# Patient Record
Sex: FEMALE | Race: WHITE | NOT HISPANIC OR LATINO | Employment: OTHER | URBAN - METROPOLITAN AREA
[De-identification: names, ages, dates, MRNs, and addresses within clinical notes are randomized per-mention and may not be internally consistent; named-entity substitution may affect disease eponyms.]

---

## 2017-08-02 ENCOUNTER — HOSPITAL ENCOUNTER (OUTPATIENT)
Dept: RADIOLOGY | Facility: MEDICAL CENTER | Age: 57
End: 2017-08-02
Attending: NURSE PRACTITIONER
Payer: COMMERCIAL

## 2017-08-02 ENCOUNTER — TELEPHONE (OUTPATIENT)
Dept: MEDICAL GROUP | Facility: PHYSICIAN GROUP | Age: 57
End: 2017-08-02

## 2017-08-02 ENCOUNTER — OFFICE VISIT (OUTPATIENT)
Dept: MEDICAL GROUP | Facility: PHYSICIAN GROUP | Age: 57
End: 2017-08-02
Payer: COMMERCIAL

## 2017-08-02 VITALS
WEIGHT: 151 LBS | DIASTOLIC BLOOD PRESSURE: 76 MMHG | HEART RATE: 60 BPM | BODY MASS INDEX: 22.88 KG/M2 | HEIGHT: 68 IN | OXYGEN SATURATION: 95 % | TEMPERATURE: 98 F | SYSTOLIC BLOOD PRESSURE: 98 MMHG | RESPIRATION RATE: 14 BRPM

## 2017-08-02 DIAGNOSIS — E78.2 MIXED HYPERLIPIDEMIA: ICD-10-CM

## 2017-08-02 DIAGNOSIS — M54.12 CERVICAL RADICULOPATHY, ACUTE: ICD-10-CM

## 2017-08-02 DIAGNOSIS — M54.12 CERVICAL RADICULOPATHY, ACUTE: Primary | ICD-10-CM

## 2017-08-02 DIAGNOSIS — M25.842 CYST OF JOINT OF LEFT HAND: ICD-10-CM

## 2017-08-02 DIAGNOSIS — Z51.81 ENCOUNTER FOR MONITORING STATIN THERAPY: ICD-10-CM

## 2017-08-02 DIAGNOSIS — Z79.899 ENCOUNTER FOR MONITORING STATIN THERAPY: ICD-10-CM

## 2017-08-02 DIAGNOSIS — E06.3 HYPOTHYROIDISM, ACQUIRED, AUTOIMMUNE: ICD-10-CM

## 2017-08-02 DIAGNOSIS — M15.9 PRIMARY OSTEOARTHRITIS INVOLVING MULTIPLE JOINTS: ICD-10-CM

## 2017-08-02 PROCEDURE — 99214 OFFICE O/P EST MOD 30 MIN: CPT | Performed by: NURSE PRACTITIONER

## 2017-08-02 PROCEDURE — 72040 X-RAY EXAM NECK SPINE 2-3 VW: CPT

## 2017-08-02 RX ORDER — CELECOXIB 200 MG/1
200 CAPSULE ORAL DAILY
Qty: 90 CAP | Refills: 1 | Status: SHIPPED | OUTPATIENT
Start: 2017-08-02 | End: 2018-08-28 | Stop reason: SDUPTHER

## 2017-08-02 RX ORDER — LEVOTHYROXINE SODIUM 0.07 MG/1
75 TABLET ORAL
Qty: 90 TAB | Refills: 3 | Status: SHIPPED | OUTPATIENT
Start: 2017-08-02 | End: 2018-05-22

## 2017-08-03 ENCOUNTER — TELEPHONE (OUTPATIENT)
Dept: MEDICAL GROUP | Facility: PHYSICIAN GROUP | Age: 57
End: 2017-08-03

## 2017-08-03 ENCOUNTER — HOSPITAL ENCOUNTER (OUTPATIENT)
Dept: LAB | Facility: MEDICAL CENTER | Age: 57
End: 2017-08-03
Attending: NURSE PRACTITIONER
Payer: COMMERCIAL

## 2017-08-03 DIAGNOSIS — Z51.81 ENCOUNTER FOR MONITORING STATIN THERAPY: ICD-10-CM

## 2017-08-03 DIAGNOSIS — Z79.899 ENCOUNTER FOR MONITORING STATIN THERAPY: ICD-10-CM

## 2017-08-03 DIAGNOSIS — E78.2 MIXED HYPERLIPIDEMIA: ICD-10-CM

## 2017-08-03 DIAGNOSIS — E06.3 HYPOTHYROIDISM, ACQUIRED, AUTOIMMUNE: ICD-10-CM

## 2017-08-03 LAB
ALBUMIN SERPL BCP-MCNC: 4.2 G/DL (ref 3.2–4.9)
ALBUMIN/GLOB SERPL: 1.5 G/DL
ALP SERPL-CCNC: 70 U/L (ref 30–99)
ALT SERPL-CCNC: 33 U/L (ref 2–50)
ANION GAP SERPL CALC-SCNC: 7 MMOL/L (ref 0–11.9)
AST SERPL-CCNC: 19 U/L (ref 12–45)
BILIRUB SERPL-MCNC: 0.7 MG/DL (ref 0.1–1.5)
BUN SERPL-MCNC: 15 MG/DL (ref 8–22)
CALCIUM SERPL-MCNC: 9.6 MG/DL (ref 8.5–10.5)
CHLORIDE SERPL-SCNC: 106 MMOL/L (ref 96–112)
CHOLEST SERPL-MCNC: 275 MG/DL (ref 100–199)
CO2 SERPL-SCNC: 27 MMOL/L (ref 20–33)
CREAT SERPL-MCNC: 0.7 MG/DL (ref 0.5–1.4)
GFR SERPL CREATININE-BSD FRML MDRD: >60 ML/MIN/1.73 M 2
GLOBULIN SER CALC-MCNC: 2.8 G/DL (ref 1.9–3.5)
GLUCOSE SERPL-MCNC: 85 MG/DL (ref 65–99)
HDLC SERPL-MCNC: 45 MG/DL
LDLC SERPL CALC-MCNC: 192 MG/DL
POTASSIUM SERPL-SCNC: 4.3 MMOL/L (ref 3.6–5.5)
PROT SERPL-MCNC: 7 G/DL (ref 6–8.2)
SODIUM SERPL-SCNC: 140 MMOL/L (ref 135–145)
TRIGL SERPL-MCNC: 189 MG/DL (ref 0–149)
TSH SERPL DL<=0.005 MIU/L-ACNC: 3.55 UIU/ML (ref 0.3–3.7)

## 2017-08-03 PROCEDURE — 80053 COMPREHEN METABOLIC PANEL: CPT

## 2017-08-03 PROCEDURE — 36415 COLL VENOUS BLD VENIPUNCTURE: CPT

## 2017-08-03 PROCEDURE — 84443 ASSAY THYROID STIM HORMONE: CPT

## 2017-08-03 PROCEDURE — 80061 LIPID PANEL: CPT

## 2017-08-03 NOTE — TELEPHONE ENCOUNTER
----- Message from JOEL Banerjee sent at 8/3/2017  2:43 PM PDT -----  Patient's cholesterol is very high. Is she taking her Lipitor?    Thank you

## 2017-08-03 NOTE — TELEPHONE ENCOUNTER
Patient stop taking her Lipitor for a month. She said she would make sure she picks it up and takes it from now on.

## 2017-08-03 NOTE — TELEPHONE ENCOUNTER
----- Message from JOEL Banerjee sent at 8/2/2017  5:06 PM PDT -----  Xray reveals mild osteoarthritis.  Proceed with MRI scan.

## 2017-08-03 NOTE — PROGRESS NOTES
Chief Complaint   Patient presents with   • Arthritis       HISTORY OF PRESENT ILLNESS: Patient is a 57 y.o. female established patient who presents today to discuss the followin. Cervical radiculopathy, acute  Patient reports new symptom of intermittent pain and a altered sensation going into her right arm/hand.  She believes that she pulled a muscle in her back that started the symptoms.  She complains of pain not only into the arm but discomfort into the hand including all fingers.  She denies any injury to the neck or shoulder.  Denies any altered range of motion of the shoulder.  Denies any headaches, new or changing dizziness.  She has not tried any over-the-counter remedies since the onset of symptoms.  It does not interfere with her sleep or daily activities.    2. Primary osteoarthritis involving multiple joints  She is also frustrated about worsening osteoarthritis, primarily in her hands.  She has used Celebrex in the past and is asking if this is a continued option.  She denies any side effects in the past with Celebrex.  She states that her mother suffers from arthritis as well.  Denies any warmth redness or swelling of the joints.    3. Cyst of joint of left hand  She has a cyst on the top of her left hand.  She states that it has gotten quite a bit larger over the past several weeks.  It is not causing any significant pain or nerve pain into the hand.  She finds it aesthetically unpleasing.    4. Mixed hyperlipidemia  Patient has personal history of elevated cholesterol values.  She continues to use atorvastatin 80 mg.  She is approaching due for repeat labs.    Lab Results   Component Value Date/Time    CHOLESTEROL, 2016 09:41 AM    LDL 97 2016 09:41 AM    HDL 56 2016 09:41 AM    TRIGLYCERIDES 111 2016 09:41 AM       Lab Results   Component Value Date/Time    SODIUM 141 2016 09:41 AM    POTASSIUM 4.4 2016 09:41 AM    CHLORIDE 106 2016 09:41 AM     CO2 28 2016 09:41 AM    GLUCOSE 92 2016 09:41 AM    BUN 15 2016 09:41 AM    CREATININE 0.76 2016 09:41 AM     Lab Results   Component Value Date/Time    ALKALINE PHOSPHATASE 61 2016 09:41 AM    AST(SGOT) 29 2016 09:41 AM    ALT(SGPT) 44 2016 09:41 AM    TOTAL BILIRUBIN 0.8 2016 09:41 AM       5. Hypothyroidism, acquired, autoimmune  She also continues to use levothyroxine 75 µg daily.  Denies any side effects with the medication.  Denies any recent changes in her weight, energy level or bowel habits.    6. Encounter for monitoring statin therapy    Allergies:Review of patient's allergies indicates no known allergies.    Current Outpatient Prescriptions Ordered in [facility]   Medication Sig Dispense Refill   • celecoxib (CELEBREX) 200 MG Cap Take 1 Cap by mouth every day. 90 Cap 1   • levothyroxine (SYNTHROID) 75 MCG Tab Take 1 Tab by mouth Every morning on an empty stomach. Take one tablet each day for 6 days and one half tablet for one day each week 90 Tab 3   • atorvastatin (LIPITOR) 80 MG tablet Take 1 Tab by mouth every evening. 90 Tab 1   • Nutritional Supplements (ESTROVEN PO) Take  by mouth.       No current Epic-ordered facility-administered medications on file.       Past Medical History   Diagnosis Date   • Hyperlipidemia 10/21/2014   • Hypothyroidism    • Multiple thyroid nodules    • Chronic autoimmune thyroiditis       + US / + TPO =293       Social History   Substance Use Topics   • Smoking status: Never Smoker    • Smokeless tobacco: Never Used   • Alcohol Use: No       Family Status   Relation Status Death Age   • Mother Alive    • Father       Family History   Problem Relation Age of Onset   • Heart Disease Mother    • Other Father      cirrhosis   • Diabetes Neg Hx    • Stroke Neg Hx        ROS: see above    Review of Systems   Constitutional: Negative for fever, chills, weight loss and malaise/fatigue.   HENT: Negative for ear pain, nosebleeds,  "congestion, sore throat and neck pain.    Eyes: Negative for blurred vision.   Respiratory: Negative for cough, sputum production, shortness of breath and wheezing.    Cardiovascular: Negative for chest pain, palpitations, orthopnea and leg swelling.   Gastrointestinal: Negative for heartburn, nausea, vomiting and abdominal pain.   Genitourinary: Negative for dysuria, urgency and frequency.   Musculoskeletal: Negative for myalgias, back pain and joint pain.   Skin: Negative for rash and itching.   Neurological: Negative for dizziness, tingling, tremors, sensory change, focal weakness and headaches.   Endo/Heme/Allergies: Does not bruise/bleed easily.   Psychiatric/Behavioral: Negative for depression, suicidal ideas and memory loss.  The patient is not nervous/anxious and does not have insomnia.        Exam:  Blood pressure 98/76, pulse 60, temperature 36.7 °C (98 °F), resp. rate 14, height 1.727 m (5' 8\"), weight 68.493 kg (151 lb), SpO2 95 %.  General:  Well nourished, well developed female in NAD  Head is grossly normal.  Neck: Thyroid is not enlarged.  Pulmonary: Normal effort.   Cardiovascular: Regular rate.   Extremities: no clubbing, cyanosis, or edema.  Neurologic:  Full range of motion of both cervical spine in all fields and right shoulder.  Strength and sensation are intact throughout the upper extremities.  No Homans sign.  Psych:  Mood and affect are normal.  Answering questions appropriately with good eye contact.      Please note that this dictation was created using voice recognition software. I have made every reasonable attempt to correct obvious errors, but I expect that there are errors of grammar and possibly content that I did not discover before finalizing the note.    Assessment/Plan:    1. Cervical radiculopathy, acute  DX-CERVICAL SPINE-2 OR 3 VIEWS    REFERRAL TO PHYSIATRY (PMR)    MR-CERVICAL SPINE-W/O   2. Primary osteoarthritis involving multiple joints  celecoxib (CELEBREX) 200 MG Cap "    REFERRAL TO PHYSIATRY (PMR)   3. Cyst of joint of left hand  US-EXTREMITY NON VASCULAR UNILATERAL LEFT   4. Mixed hyperlipidemia  LIPID PROFILE   5. Hypothyroidism, acquired, autoimmune  TSH WITH REFLEX TO FT4    levothyroxine (SYNTHROID) 75 MCG Tab   6. Encounter for monitoring statin therapy  COMP METABOLIC PANEL        1.  Cervical x-rays today.  Patient insisting on MRI scan due to the nerve type symptoms.  Start with x-ray.  2.  Referral to physiatry to explore nonsurgical options for pain and symptom relief.  3.  Restart Celebrex at 200 mg once a day.  4.  Repeat labs within the next couple of weeks, orders given.  5.  Refill Synthroid at current dose until labs are reviewed to see if dose needs to be adjusted pending lab results.

## 2017-08-17 ENCOUNTER — HOSPITAL ENCOUNTER (OUTPATIENT)
Dept: RADIOLOGY | Facility: MEDICAL CENTER | Age: 57
End: 2017-08-17
Attending: NURSE PRACTITIONER
Payer: COMMERCIAL

## 2017-08-17 DIAGNOSIS — M54.12 CERVICAL RADICULOPATHY, ACUTE: ICD-10-CM

## 2017-08-17 DIAGNOSIS — M25.842 CYST OF JOINT OF LEFT HAND: ICD-10-CM

## 2017-08-17 PROCEDURE — 76882 US LMTD JT/FCL EVL NVASC XTR: CPT | Mod: LT

## 2017-08-17 PROCEDURE — 72141 MRI NECK SPINE W/O DYE: CPT

## 2017-08-21 ENCOUNTER — TELEPHONE (OUTPATIENT)
Dept: MEDICAL GROUP | Facility: PHYSICIAN GROUP | Age: 57
End: 2017-08-21

## 2017-08-21 NOTE — TELEPHONE ENCOUNTER
----- Message from JOEL Banerjee sent at 8/21/2017  8:15 AM PDT -----  US reveals likely ganglion cyst.  It is not harmful or worrisome.  We consider ortho referral if she wants to proceed with removal.  Thank you

## 2017-08-22 ENCOUNTER — OFFICE VISIT (OUTPATIENT)
Dept: MEDICAL GROUP | Facility: PHYSICIAN GROUP | Age: 57
End: 2017-08-22
Payer: COMMERCIAL

## 2017-08-22 VITALS
TEMPERATURE: 98.3 F | SYSTOLIC BLOOD PRESSURE: 94 MMHG | HEIGHT: 68 IN | RESPIRATION RATE: 16 BRPM | OXYGEN SATURATION: 95 % | DIASTOLIC BLOOD PRESSURE: 62 MMHG | HEART RATE: 64 BPM

## 2017-08-22 DIAGNOSIS — M67.442 GANGLION CYST OF FINGER OF LEFT HAND: ICD-10-CM

## 2017-08-22 DIAGNOSIS — M54.12 RIGHT CERVICAL RADICULOPATHY: Primary | ICD-10-CM

## 2017-08-22 PROCEDURE — 99213 OFFICE O/P EST LOW 20 MIN: CPT | Performed by: NURSE PRACTITIONER

## 2017-08-22 NOTE — PROGRESS NOTES
Chief Complaint   Patient presents with   • Follow-Up     options after US & MRI       HISTORY OF PRESENT ILLNESS: Patient is a 57 y.o. female established patient who presents today with her  to discuss recent imaging results:    1. Right cervical radiculopathy  Patient's most recent MRI results are reviewed with both her and her .  She continues to experience intermittent paresthesias into the right hand.  She states that there are times she has to rest or getting ready in the morning secondary to the discomfort.  Usually resolves within 10-15 seconds.  She continues to deny any significant neck pain.  MRI scan reveals mild disc bulge at C4-5 and C5-6 with facet arthropathy.  Patient has not had any formal treatment.  She does have an upcoming appointment with physiatry on September 5.  Continues to deny any extremity weakness or loss of bowel or bladder control.    2. Ganglion cyst of finger of left hand  Ultrasound of the cyst on the left hand also reveals benign finding, likely ganglion cyst.  Patient continues to deny any pain or numbness and tingling into the fingers secondary to this visual abnormality.  She does not feel as though surgical intervention is necessary at this time.    Allergies:Review of patient's allergies indicates no known allergies.    Current Outpatient Prescriptions Ordered in [facility]   Medication Sig Dispense Refill   • celecoxib (CELEBREX) 200 MG Cap Take 1 Cap by mouth every day. 90 Cap 1   • levothyroxine (SYNTHROID) 75 MCG Tab Take 1 Tab by mouth Every morning on an empty stomach. Take one tablet each day for 6 days and one half tablet for one day each week 90 Tab 3   • atorvastatin (LIPITOR) 80 MG tablet Take 1 Tab by mouth every evening. 90 Tab 1   • Nutritional Supplements (ESTROVEN PO) Take  by mouth.       No current Epic-ordered facility-administered medications on file.       Past Medical History   Diagnosis Date   • Hyperlipidemia 10/21/2014   • Hypothyroidism    •  "Multiple thyroid nodules    • Chronic autoimmune thyroiditis       + US / + TPO =293       Social History   Substance Use Topics   • Smoking status: Never Smoker    • Smokeless tobacco: Never Used   • Alcohol Use: No       Family Status   Relation Status Death Age   • Mother Alive    • Father       Family History   Problem Relation Age of Onset   • Heart Disease Mother    • Other Father      cirrhosis   • Diabetes Neg Hx    • Stroke Neg Hx        ROS: see above    Review of Systems   Constitutional: Negative for fever, chills, weight loss and malaise/fatigue.   HENT: Negative for ear pain, nosebleeds, congestion, sore throat and neck pain.    Eyes: Negative for blurred vision.   Respiratory: Negative for cough, sputum production, shortness of breath and wheezing.    Cardiovascular: Negative for chest pain, palpitations, orthopnea and leg swelling.   Gastrointestinal: Negative for heartburn, nausea, vomiting and abdominal pain.   Genitourinary: Negative for dysuria, urgency and frequency.   Musculoskeletal: Negative for myalgias, back pain and joint pain.   Skin: Negative for rash and itching.   Neurological: Negative for dizziness, tingling, tremors, sensory change, focal weakness and headaches.   Endo/Heme/Allergies: Does not bruise/bleed easily.   Psychiatric/Behavioral: Negative for depression, suicidal ideas and memory loss.  The patient is not nervous/anxious and does not have insomnia.        Exam:  Blood pressure 94/62, pulse 64, temperature 36.8 °C (98.3 °F), resp. rate 16, height 1.727 m (5' 8\"), SpO2 95 %.  General:  Well nourished, well developed female in NAD  Head is grossly normal.  Neck: Thyroid is not enlarged.  Pulmonary: Normal effort.   Cardiovascular: Regular rate.   Extremities: no clubbing, cyanosis, or edema.  Psych:  Mood and affect are normal.  Answering questions appropriately with good eye contact.      Please note that this dictation was created using voice recognition software. I " have made every reasonable attempt to correct obvious errors, but I expect that there are errors of grammar and possibly content that I did not discover before finalizing the note.    Assessment/Plan:    1. Right cervical radiculopathy     2. Ganglion cyst of finger of left hand          1.  Proceed with conservative evaluation by physiatry.  2.  Hold off on any additional treatment of the ganglion cyst, offered surgical referral if symptoms worsen.  3.  Transfer of care to new PCP prior to upcoming medication refill request.

## 2017-11-28 DIAGNOSIS — E78.2 MIXED HYPERLIPIDEMIA: ICD-10-CM

## 2017-11-29 NOTE — TELEPHONE ENCOUNTER
Was the patient seen in the last year in this department? Yes     Does patient have an active prescription for medications requested? No     Received Request Via: Pharmacy      Pt met protocol?: Yes, labs and ov 8/17 lipid profile high, pt needs to est with new pcp

## 2017-11-30 RX ORDER — ATORVASTATIN CALCIUM 80 MG/1
TABLET, FILM COATED ORAL
Qty: 90 TAB | Refills: 0 | Status: SHIPPED | OUTPATIENT
Start: 2017-11-30 | End: 2018-04-09 | Stop reason: SDUPTHER

## 2018-01-03 DIAGNOSIS — E06.3 HYPOTHYROIDISM, ACQUIRED, AUTOIMMUNE: ICD-10-CM

## 2018-01-03 DIAGNOSIS — E78.2 MIXED HYPERLIPIDEMIA: ICD-10-CM

## 2018-01-03 DIAGNOSIS — M15.9 PRIMARY OSTEOARTHRITIS INVOLVING MULTIPLE JOINTS: ICD-10-CM

## 2018-01-03 RX ORDER — LEVOTHYROXINE SODIUM 0.07 MG/1
75 TABLET ORAL
Qty: 90 TAB | Refills: 3 | Status: CANCELLED | OUTPATIENT
Start: 2018-01-03

## 2018-01-03 RX ORDER — ATORVASTATIN CALCIUM 80 MG/1
80 TABLET, FILM COATED ORAL
Qty: 90 TAB | Refills: 0 | Status: CANCELLED | OUTPATIENT
Start: 2018-01-03

## 2018-01-03 RX ORDER — CELECOXIB 200 MG/1
200 CAPSULE ORAL DAILY
Qty: 90 CAP | Refills: 1 | Status: CANCELLED | OUTPATIENT
Start: 2018-01-03

## 2018-01-29 ENCOUNTER — TELEPHONE (OUTPATIENT)
Dept: URGENT CARE | Facility: PHYSICIAN GROUP | Age: 58
End: 2018-01-29

## 2018-01-29 DIAGNOSIS — R53.83 FATIGUE, UNSPECIFIED TYPE: ICD-10-CM

## 2018-01-29 DIAGNOSIS — E78.2 MIXED HYPERLIPIDEMIA: ICD-10-CM

## 2018-01-29 DIAGNOSIS — E06.3 HYPOTHYROIDISM, ACQUIRED, AUTOIMMUNE: ICD-10-CM

## 2018-01-30 NOTE — TELEPHONE ENCOUNTER
This patient stopped by. Has an appointment to establish care beginning of April. She is leaving the country for two months to take care of family. She needs refills of her Lipitor and Levothyroxine. However, she is also concerned that she will need lab work done to determine the appropriate dosages of these medications.

## 2018-01-30 NOTE — TELEPHONE ENCOUNTER
I ordered lab work.  When she has her labs done, I will send refills of the appropriate doses of medication.  She is not signed up with [facility], so she needs to remind me to send in refills after she has her lab work done.  Thanks!

## 2018-02-01 NOTE — TELEPHONE ENCOUNTER
I've called the patient and let her know that her labs are available. She plans to come in tomorrow to get them done.

## 2018-02-02 ENCOUNTER — HOSPITAL ENCOUNTER (OUTPATIENT)
Dept: LAB | Facility: MEDICAL CENTER | Age: 58
End: 2018-02-02
Attending: NURSE PRACTITIONER
Payer: COMMERCIAL

## 2018-02-02 DIAGNOSIS — E06.3 HYPOTHYROIDISM, ACQUIRED, AUTOIMMUNE: ICD-10-CM

## 2018-02-02 DIAGNOSIS — E78.2 MIXED HYPERLIPIDEMIA: ICD-10-CM

## 2018-02-02 LAB
ALBUMIN SERPL BCP-MCNC: 4.9 G/DL (ref 3.2–4.9)
ALBUMIN/GLOB SERPL: 2 G/DL
ALP SERPL-CCNC: 60 U/L (ref 30–99)
ALT SERPL-CCNC: 27 U/L (ref 2–50)
ANION GAP SERPL CALC-SCNC: 5 MMOL/L (ref 0–11.9)
AST SERPL-CCNC: 20 U/L (ref 12–45)
BASOPHILS # BLD AUTO: 1.3 % (ref 0–1.8)
BASOPHILS # BLD: 0.05 K/UL (ref 0–0.12)
BILIRUB SERPL-MCNC: 0.7 MG/DL (ref 0.1–1.5)
BUN SERPL-MCNC: 16 MG/DL (ref 8–22)
CALCIUM SERPL-MCNC: 9.6 MG/DL (ref 8.5–10.5)
CHLORIDE SERPL-SCNC: 106 MMOL/L (ref 96–112)
CHOLEST SERPL-MCNC: 181 MG/DL (ref 100–199)
CO2 SERPL-SCNC: 30 MMOL/L (ref 20–33)
CREAT SERPL-MCNC: 0.76 MG/DL (ref 0.5–1.4)
EOSINOPHIL # BLD AUTO: 0.03 K/UL (ref 0–0.51)
EOSINOPHIL NFR BLD: 0.8 % (ref 0–6.9)
ERYTHROCYTE [DISTWIDTH] IN BLOOD BY AUTOMATED COUNT: 40.2 FL (ref 35.9–50)
GLOBULIN SER CALC-MCNC: 2.4 G/DL (ref 1.9–3.5)
GLUCOSE SERPL-MCNC: 92 MG/DL (ref 65–99)
HCT VFR BLD AUTO: 41.2 % (ref 37–47)
HDLC SERPL-MCNC: 62 MG/DL
HGB BLD-MCNC: 13.5 G/DL (ref 12–16)
IMM GRANULOCYTES # BLD AUTO: 0.01 K/UL (ref 0–0.11)
IMM GRANULOCYTES NFR BLD AUTO: 0.3 % (ref 0–0.9)
LDLC SERPL CALC-MCNC: 98 MG/DL
LYMPHOCYTES # BLD AUTO: 1.27 K/UL (ref 1–4.8)
LYMPHOCYTES NFR BLD: 34.2 % (ref 22–41)
MCH RBC QN AUTO: 28.2 PG (ref 27–33)
MCHC RBC AUTO-ENTMCNC: 32.8 G/DL (ref 33.6–35)
MCV RBC AUTO: 86 FL (ref 81.4–97.8)
MONOCYTES # BLD AUTO: 0.26 K/UL (ref 0–0.85)
MONOCYTES NFR BLD AUTO: 7 % (ref 0–13.4)
NEUTROPHILS # BLD AUTO: 2.09 K/UL (ref 2–7.15)
NEUTROPHILS NFR BLD: 56.4 % (ref 44–72)
NRBC # BLD AUTO: 0 K/UL
NRBC BLD-RTO: 0 /100 WBC
PLATELET # BLD AUTO: 208 K/UL (ref 164–446)
PMV BLD AUTO: 10.4 FL (ref 9–12.9)
POTASSIUM SERPL-SCNC: 4.2 MMOL/L (ref 3.6–5.5)
PROT SERPL-MCNC: 7.3 G/DL (ref 6–8.2)
RBC # BLD AUTO: 4.79 M/UL (ref 4.2–5.4)
SODIUM SERPL-SCNC: 141 MMOL/L (ref 135–145)
TRIGL SERPL-MCNC: 104 MG/DL (ref 0–149)
TSH SERPL DL<=0.005 MIU/L-ACNC: 1.92 UIU/ML (ref 0.38–5.33)
WBC # BLD AUTO: 3.7 K/UL (ref 4.8–10.8)

## 2018-02-02 PROCEDURE — 85025 COMPLETE CBC W/AUTO DIFF WBC: CPT

## 2018-02-02 PROCEDURE — 80061 LIPID PANEL: CPT

## 2018-02-02 PROCEDURE — 84443 ASSAY THYROID STIM HORMONE: CPT

## 2018-02-02 PROCEDURE — 36415 COLL VENOUS BLD VENIPUNCTURE: CPT

## 2018-02-02 PROCEDURE — 80053 COMPREHEN METABOLIC PANEL: CPT

## 2018-02-06 ENCOUNTER — TELEPHONE (OUTPATIENT)
Dept: MEDICAL GROUP | Facility: PHYSICIAN GROUP | Age: 58
End: 2018-02-06

## 2018-02-06 NOTE — TELEPHONE ENCOUNTER
----- Message from JOEL Rizo sent at 2/6/2018  8:23 AM PST -----  Please call patient and let her know that her labs look pretty good.  She should plan to follow-up if she has any questions.    Thank you!  Henry

## 2018-04-02 ENCOUNTER — OFFICE VISIT (OUTPATIENT)
Dept: MEDICAL GROUP | Facility: PHYSICIAN GROUP | Age: 58
End: 2018-04-02
Payer: COMMERCIAL

## 2018-04-02 VITALS
HEART RATE: 74 BPM | WEIGHT: 152 LBS | SYSTOLIC BLOOD PRESSURE: 116 MMHG | TEMPERATURE: 99.1 F | DIASTOLIC BLOOD PRESSURE: 60 MMHG | BODY MASS INDEX: 23.04 KG/M2 | HEIGHT: 68 IN | RESPIRATION RATE: 16 BRPM | OXYGEN SATURATION: 96 %

## 2018-04-02 DIAGNOSIS — E04.1 THYROID NODULE: ICD-10-CM

## 2018-04-02 DIAGNOSIS — R93.89 ABNORMAL FINDING ON IMAGING: ICD-10-CM

## 2018-04-02 DIAGNOSIS — Z76.89 ENCOUNTER TO ESTABLISH CARE WITH NEW DOCTOR: ICD-10-CM

## 2018-04-02 DIAGNOSIS — E04.2 MULTIPLE THYROID NODULES: ICD-10-CM

## 2018-04-02 PROCEDURE — 99214 OFFICE O/P EST MOD 30 MIN: CPT | Performed by: NURSE PRACTITIONER

## 2018-04-02 ASSESSMENT — PATIENT HEALTH QUESTIONNAIRE - PHQ9: CLINICAL INTERPRETATION OF PHQ2 SCORE: 0

## 2018-04-02 NOTE — PROGRESS NOTES
Chief Complaint   Patient presents with   • Hypothyroidism     Synthroid refill    • Other     req sinocine and kidney US       HISTORY OF PRESENT ILLNESS: Patient is a 58 y.o. female new patient who presents today to discuss the following issues:    Encounter to establish care with new doctor  Is here to establish with a new primary care provider.  Was previously seen by Addie Cordon.    Abnormal finding on imaging  Patient just returned from [place] and had an ultrasound done while she was there.  She states that they found something on one of her kidneys that has never been there before.  I am not clear as to why they have been checking renal ultrasounds, but we will proceed with ordering an ultrasound today for further evaluation.    Multiple thyroid nodules  It has been a few years since her last thyroid ultrasound and she would like to proceed with another follow-up ultrasound.  Her recent lab results were normal.      Patient Active Problem List    Diagnosis Date Noted   • Encounter to establish care with new doctor 04/02/2018   • Thyroid nodule 04/02/2018   • Abnormal finding on imaging 04/02/2018   • Hypothyroidism, acquired, autoimmune 03/30/2016   • Multiple thyroid nodules 08/12/2015   • Nasal sinus cyst 08/12/2015   • Bilateral shoulder pain 08/12/2015   • Fatigue 07/24/2015   • Family history of heart disease in female family member before age 65 07/24/2015   • Hyperlipidemia 10/21/2014   • S/P laminectomy 10/21/2014   • Osteopenia 10/21/2014       Allergies:Patient has no known allergies.    Current Outpatient Prescriptions   Medication Sig Dispense Refill   • atorvastatin (LIPITOR) 80 MG tablet TAKE 1 TABLET BY MOUTH EVERY EVENING 90 Tab 0   • celecoxib (CELEBREX) 200 MG Cap Take 1 Cap by mouth every day. 90 Cap 1   • levothyroxine (SYNTHROID) 75 MCG Tab Take 1 Tab by mouth Every morning on an empty stomach. Take one tablet each day for 6 days and one half tablet for one day each week 90 Tab 3   •  "Nutritional Supplements (ESTROVEN PO) Take  by mouth.       No current facility-administered medications for this visit.        Social History   Substance Use Topics   • Smoking status: Never Smoker   • Smokeless tobacco: Never Used   • Alcohol use No       Family Status   Relation Status   • Mother Alive   • Father    • Neg Hx      Family History   Problem Relation Age of Onset   • Heart Disease Mother    • Other Father      cirrhosis   • Diabetes Neg Hx    • Stroke Neg Hx        Review of Systems:   Constitutional: Negative for fever, chills, weight loss and malaise/fatigue.   HENT: Negative for ear pain, nosebleeds, congestion, sore throat and neck pain.    Eyes: Negative for blurred vision.   Respiratory: Negative for cough, sputum production, shortness of breath and wheezing.    Cardiovascular: Negative for chest pain, palpitations, orthopnea and leg swelling.   Gastrointestinal: Negative for heartburn, nausea, vomiting and abdominal pain.   Genitourinary: Negative for dysuria, urgency and frequency.   Musculoskeletal: Negative for myalgias, joint pain, and back pain.  Skin: Negative for rash and itching.   Neurological: Negative for dizziness, tingling, tremors, sensory change, focal weakness and headaches.   Endo/Heme/Allergies: Does not bruise/bleed easily.   Psychiatric/Behavioral: Negative for depression, suicidal ideas and memory loss.  The patient is not nervous/anxious and does not have insomnia.    All other systems reviewed and are negative except as in HPI.    Exam:  Blood pressure 116/60, pulse 74, temperature 37.3 °C (99.1 °F), resp. rate 16, height 1.727 m (5' 8\"), weight 68.9 kg (152 lb), SpO2 96 %.  General:  Well nourished, well developed female in NAD  Head: Grossly normal.  Neck: Supple without JVD or bruit. Thyroid is not enlarged.  Pulmonary: Clear to ausculation. Normal effort. No rales, ronchi, or wheezing.  Cardiovascular: Regular rate and rhythm without murmur.   Abdomen:  Bowel " sounds + x 4. Soft, non-tender, nondistended.  Extremities: No clubbing, cyanosis, or edema.  Skin: Intact with no obvious rashes or lesions.  Neuro: Grossly intact.  Psych: Alert and oriented x 3.  Mood and affect appropriate.    Medical decision-making and discussion: Vivi is here to establish with a new primary care provider.  We reviewed her past medical history and discussed her current medications. Ultrasounds of her kidneys and her thyroid were ordered. She will sign a records release for her previous provider, she will sign up with [facility], and she will plan to follow-up here as needed.         Assessment/Plan:  1. Abnormal finding on imaging  US-RENAL   2. Thyroid nodule  US-SOFT TISSUES OF HEAD - NECK   3. Encounter to establish care with new doctor     4. Multiple thyroid nodules         Return if symptoms worsen or fail to improve.    Please note that this dictation was created using voice recognition software. I have made every reasonable attempt to correct obvious errors, but I expect that there are errors of grammar and possibly content that I did not discover before finalizing the note.

## 2018-04-02 NOTE — ASSESSMENT & PLAN NOTE
It has been a few years since her last thyroid ultrasound and she would like to proceed with another follow-up ultrasound.  Her recent lab results were normal.

## 2018-04-02 NOTE — ASSESSMENT & PLAN NOTE
Patient just returned from [place] and had an ultrasound done while she was there.  She states that they found something on one of her kidneys that has never been there before.  I am not clear as to why they have been checking renal ultrasounds, but we will proceed with ordering an ultrasound today for further evaluation.

## 2018-04-09 DIAGNOSIS — E78.2 MIXED HYPERLIPIDEMIA: ICD-10-CM

## 2018-04-10 RX ORDER — ATORVASTATIN CALCIUM 80 MG/1
TABLET, FILM COATED ORAL
Qty: 90 TAB | Refills: 1 | Status: SHIPPED | OUTPATIENT
Start: 2018-04-10 | End: 2018-12-05 | Stop reason: SDUPTHER

## 2018-04-10 NOTE — TELEPHONE ENCOUNTER
Pt has had OV within the 12 month protocol and lipid panel is current. 6 month supply sent to pharmacy.   Lab Results   Component Value Date/Time    CHOLSTRLTOT 181 02/02/2018 09:56 AM    LDL 98 02/02/2018 09:56 AM    HDL 62 02/02/2018 09:56 AM    TRIGLYCERIDE 104 02/02/2018 09:56 AM       Lab Results   Component Value Date/Time    SODIUM 141 02/02/2018 09:56 AM    POTASSIUM 4.2 02/02/2018 09:56 AM    CHLORIDE 106 02/02/2018 09:56 AM    CO2 30 02/02/2018 09:56 AM    GLUCOSE 92 02/02/2018 09:56 AM    BUN 16 02/02/2018 09:56 AM    CREATININE 0.76 02/02/2018 09:56 AM     Lab Results   Component Value Date/Time    ALKPHOSPHAT 60 02/02/2018 09:56 AM    ASTSGOT 20 02/02/2018 09:56 AM    ALTSGPT 27 02/02/2018 09:56 AM    TBILIRUBIN 0.7 02/02/2018 09:56 AM

## 2018-04-10 NOTE — TELEPHONE ENCOUNTER
Was the patient seen in the last year in this department? Yes     Does patient have an active prescription for medications requested? No     Received Request Via: Pharmacy      Pt met protocol?: Yes, OV last week   Lab Results   Component Value Date/Time    CHOLSTRLTOT 181 02/02/2018 09:56 AM    LDL 98 02/02/2018 09:56 AM    HDL 62 02/02/2018 09:56 AM    TRIGLYCERIDE 104 02/02/2018 09:56 AM       Lab Results   Component Value Date/Time    SODIUM 141 02/02/2018 09:56 AM    POTASSIUM 4.2 02/02/2018 09:56 AM    CHLORIDE 106 02/02/2018 09:56 AM    CO2 30 02/02/2018 09:56 AM    GLUCOSE 92 02/02/2018 09:56 AM    BUN 16 02/02/2018 09:56 AM    CREATININE 0.76 02/02/2018 09:56 AM     Lab Results   Component Value Date/Time    ALKPHOSPHAT 60 02/02/2018 09:56 AM    ASTSGOT 20 02/02/2018 09:56 AM    ALTSGPT 27 02/02/2018 09:56 AM    TBILIRUBIN 0.7 02/02/2018 09:56 AM

## 2018-04-11 ENCOUNTER — HOSPITAL ENCOUNTER (OUTPATIENT)
Dept: RADIOLOGY | Facility: MEDICAL CENTER | Age: 58
End: 2018-04-11
Attending: NURSE PRACTITIONER
Payer: COMMERCIAL

## 2018-04-11 DIAGNOSIS — R93.89 ABNORMAL FINDING ON IMAGING: ICD-10-CM

## 2018-04-11 DIAGNOSIS — E04.1 THYROID NODULE: ICD-10-CM

## 2018-04-11 PROCEDURE — 76775 US EXAM ABDO BACK WALL LIM: CPT

## 2018-04-11 PROCEDURE — 76536 US EXAM OF HEAD AND NECK: CPT

## 2018-04-17 ENCOUNTER — TELEPHONE (OUTPATIENT)
Dept: MEDICAL GROUP | Facility: PHYSICIAN GROUP | Age: 58
End: 2018-04-17

## 2018-04-17 NOTE — TELEPHONE ENCOUNTER
----- Message from JOEL Rizo sent at 4/17/2018 11:15 AM PDT -----  Please call patient and let her know that I would like her to schedule a follow-up appointment to go over her test results.  It is not urgent.    Thank you!  Henry

## 2018-04-23 ENCOUNTER — OFFICE VISIT (OUTPATIENT)
Dept: MEDICAL GROUP | Facility: PHYSICIAN GROUP | Age: 58
End: 2018-04-23
Payer: COMMERCIAL

## 2018-04-23 VITALS
SYSTOLIC BLOOD PRESSURE: 108 MMHG | RESPIRATION RATE: 14 BRPM | WEIGHT: 152 LBS | BODY MASS INDEX: 23.04 KG/M2 | HEIGHT: 68 IN | HEART RATE: 61 BPM | OXYGEN SATURATION: 96 % | TEMPERATURE: 98.2 F | DIASTOLIC BLOOD PRESSURE: 88 MMHG

## 2018-04-23 DIAGNOSIS — M67.40 GANGLION CYST: ICD-10-CM

## 2018-04-23 DIAGNOSIS — E04.1 THYROID NODULE: ICD-10-CM

## 2018-04-23 DIAGNOSIS — N28.89 RENAL MASS: ICD-10-CM

## 2018-04-23 PROBLEM — R22.1 NECK MASS: Status: ACTIVE | Noted: 2018-04-23

## 2018-04-23 PROCEDURE — 99214 OFFICE O/P EST MOD 30 MIN: CPT | Performed by: INTERNAL MEDICINE

## 2018-04-23 NOTE — ASSESSMENT & PLAN NOTE
She visited her family in [place] about a year ago and had an ultrasound test for surveillance and was then told about a mass on her bilateral kidneys. She wasn't having any symptoms. Denies pain, hematuria, family history of kidney problems.

## 2018-04-23 NOTE — ASSESSMENT & PLAN NOTE
She just recently had a thyroid ultrasound to follow up on reported thyroid nodule. She has had a biopsy about 2 years but was told enough tissue wasn't taken to have a conclusive result. It seems she saw Dr. Humphrey in 2015 at which time her biopsy was done and showed benign results. She also saw Dr. Coello about a year ago who had offered reassurance. She denies swallowing difficulties.

## 2018-04-23 NOTE — PROGRESS NOTES
PRIMARY CARE CLINIC FOLLOW UP VISIT  Chief Complaint   Patient presents with   • Results     US- Renal/Thyroid      History of Present Illness     Neck mass  She just recently had a thyroid ultrasound to follow up on reported thyroid nodule. She has had a biopsy about 2 years but was told enough tissue wasn't taken to have a conclusive result. It seems she saw Dr. Humphrey in 2015 at which time her biopsy was done and showed benign results. She also saw Dr. Coello about a year ago who had offered reassurance. She denies swallowing difficulties.     Thyroid nodule  She just recently had a thyroid ultrasound to follow up on reported thyroid nodule. She has had a biopsy about 2 years but was told enough tissue wasn't taken to have a conclusive result. It seems she saw Dr. Humphrey in 2015 at which time her biopsy was done and showed benign results. She also saw Dr. Coello about a year ago who had offered reassurance. She denies swallowing difficulties.     Renal mass  She visited her family in [place] about a year ago and had an ultrasound test for surveillance and was then told about a mass on her bilateral kidneys. She wasn't having any symptoms. Denies pain, hematuria, family history of kidney problems.     Ganglion cyst  She's had a cyst in her left hand that was drained by has returned.       Current Outpatient Prescriptions   Medication Sig Dispense Refill   • atorvastatin (LIPITOR) 80 MG tablet TAKE 1 TABLET BY MOUTH EVERY EVENING 90 Tab 1   • celecoxib (CELEBREX) 200 MG Cap Take 1 Cap by mouth every day. 90 Cap 1   • levothyroxine (SYNTHROID) 75 MCG Tab Take 1 Tab by mouth Every morning on an empty stomach. Take one tablet each day for 6 days and one half tablet for one day each week 90 Tab 3   • Nutritional Supplements (ESTROVEN PO) Take  by mouth.       No current facility-administered medications for this visit.      Past Medical History:   Diagnosis Date   • Chronic autoimmune thyroiditis      + US / + TPO  "=293   • Hyperlipidemia 10/21/2014   • Hypothyroidism    • Multiple thyroid nodules      Past Surgical History:   Procedure Laterality Date   • OTHER ORTHOPEDIC SURGERY      L2-3 discectomy     Social History   Substance Use Topics   • Smoking status: Never Smoker   • Smokeless tobacco: Never Used   • Alcohol use No     Social History     Social History Narrative   • No narrative on file     Family History   Problem Relation Age of Onset   • Heart Disease Mother    • Other Father      cirrhosis   • Diabetes Neg Hx    • Stroke Neg Hx      Family Status   Relation Status   • Mother Alive   • Father    • Neg Hx      Allergies: Patient has no known allergies.    ROS  As per HPI above. All other systems reviewed and negative.        Objective   Blood pressure 108/88, pulse 61, temperature 36.8 °C (98.2 °F), resp. rate 14, height 1.727 m (5' 8\"), weight 68.9 kg (152 lb), SpO2 96 %. Body mass index is 23.11 kg/m².    General: alert and oriented, pleasant, cooperative  HEENT: Normocephalic, atraumatic. No thyroid masses.   Cardiovascular: regular rate and rhythm, normal S1/S2  Pulmonary: lungs clear to auscultation bilaterally  Gastrointestinal: no tenderness to palpation. No hepatosplenomegaly. Bowel sounds normoactive  Skin: warm and dry, no lesions or rashes. Ganglion cyst of left wrist   Psychiatric: appropriate mood and affect. Good insight and appropriate judgment     Assessment and Plan   The following treatment plan was discussed     1. Renal mass  Did discuss with her that pan-scanning in asymptomatic patients is not recommended and we would only recommend age appropriate cancer screening as offered by her primary care provider. However, given that the ultrasound shows the renal cyst will evaluate further with MRI.   - MR-ABDOMEN-WITH & W/O; Future  - BASIC METABOLIC PANEL; Future    2. Thyroid nodule  It seems her findings are stable on ultrasound and would recommend she follow up with her endocrinologist " Dr. Coello as she last saw him in 2016. Her TSH when checked earlier this year was normal.   - REFERRAL TO ENDOCRINOLOGY    3. Ganglion cyst  Did advise her that draining this cyst often leads to recurrence, as it has in her. Recommended easing stress of that hand and may try splint.       Return if symptoms worsen or fail to improve.    Teofilo Bartlett MD  Internal Medicine  [facility]

## 2018-04-24 ENCOUNTER — HOSPITAL ENCOUNTER (OUTPATIENT)
Dept: LAB | Facility: MEDICAL CENTER | Age: 58
End: 2018-04-24
Attending: INTERNAL MEDICINE
Payer: COMMERCIAL

## 2018-04-24 DIAGNOSIS — N28.89 RENAL MASS: ICD-10-CM

## 2018-04-24 LAB
ANION GAP SERPL CALC-SCNC: 9 MMOL/L (ref 0–11.9)
BUN SERPL-MCNC: 14 MG/DL (ref 8–22)
CALCIUM SERPL-MCNC: 9.7 MG/DL (ref 8.5–10.5)
CHLORIDE SERPL-SCNC: 104 MMOL/L (ref 96–112)
CO2 SERPL-SCNC: 28 MMOL/L (ref 20–33)
CREAT SERPL-MCNC: 0.77 MG/DL (ref 0.5–1.4)
GLUCOSE SERPL-MCNC: 86 MG/DL (ref 65–99)
POTASSIUM SERPL-SCNC: 3.9 MMOL/L (ref 3.6–5.5)
SODIUM SERPL-SCNC: 141 MMOL/L (ref 135–145)

## 2018-04-24 PROCEDURE — 36415 COLL VENOUS BLD VENIPUNCTURE: CPT

## 2018-04-24 PROCEDURE — 80048 BASIC METABOLIC PNL TOTAL CA: CPT

## 2018-05-22 ENCOUNTER — OFFICE VISIT (OUTPATIENT)
Dept: ENDOCRINOLOGY | Facility: MEDICAL CENTER | Age: 58
End: 2018-05-22
Payer: COMMERCIAL

## 2018-05-22 VITALS
BODY MASS INDEX: 23.98 KG/M2 | HEIGHT: 67 IN | WEIGHT: 152.8 LBS | OXYGEN SATURATION: 96 % | SYSTOLIC BLOOD PRESSURE: 114 MMHG | HEART RATE: 82 BPM | DIASTOLIC BLOOD PRESSURE: 74 MMHG

## 2018-05-22 DIAGNOSIS — E04.2 MULTIPLE THYROID NODULES: ICD-10-CM

## 2018-05-22 DIAGNOSIS — E06.3 HYPOTHYROIDISM, ACQUIRED, AUTOIMMUNE: ICD-10-CM

## 2018-05-22 PROBLEM — E04.1 THYROID NODULE: Status: RESOLVED | Noted: 2018-04-02 | Resolved: 2018-05-22

## 2018-05-22 PROCEDURE — 99213 OFFICE O/P EST LOW 20 MIN: CPT | Performed by: INTERNAL MEDICINE

## 2018-05-22 RX ORDER — LEVOTHYROXINE SODIUM 0.07 MG/1
TABLET ORAL
Qty: 30 TAB | Refills: 6
Start: 2018-05-22 | End: 2018-11-29 | Stop reason: SDUPTHER

## 2018-05-22 NOTE — PROGRESS NOTES
"Chief Complaint   Patient presents with   • Hypothyroidism     autoimmune thyroiditis / multiple thyroid nodules        HPI:    See assessment and recommendations below    ROS:   Constitutional   feels generally healthy  Eyes   vision stable  ENT    no pain or epistaxis, no unusual congestion  Cardiac   no angina, no arrhythmia  Respiratory   no hemoptysis, no unusual dyspnea, no cough  GI    no pain, indigestion, or unexpected bowel change     no voiding symptoms  Musculoskeletal    no unusual or new pains  Skin   no suspicious or concerning lesions  Neuro  no unusual weakness or loss of function  Psych   appears alert and appropriate  Lymph   no new or unusual lumps or nodules      Allergies: No Known Allergies    Current medicines including changes today:  Current Outpatient Prescriptions   Medication Sig Dispense Refill   • levothyroxine (SYNTHROID) 75 MCG Tab One half tablet each morning empty stomach 30 Tab 6   • atorvastatin (LIPITOR) 80 MG tablet TAKE 1 TABLET BY MOUTH EVERY EVENING 90 Tab 1   • celecoxib (CELEBREX) 200 MG Cap Take 1 Cap by mouth every day. 90 Cap 1   • Nutritional Supplements (ESTROVEN PO) Take  by mouth.       No current facility-administered medications for this visit.         Past Medical History:   Diagnosis Date   • Chronic autoimmune thyroiditis      + US / + TPO =293   • Hyperlipidemia 10/21/2014   • Hypothyroidism    • Multiple thyroid nodules        PHYSICAL EXAM:    /74   Pulse 82   Ht 1.694 m (5' 6.7\")   Wt 69.3 kg (152 lb 12.8 oz)   SpO2 96%   BMI 24.15 kg/m²      Gen.   appears healthy    Skin   appropriate for sex and age    HEENT  unremarkable    Neck   thyroid gland is difficult to palpate. I think it is small. No palpable nodules in the area or elsewhere in the neck or supraclavicular areas      Heart  regular    Extremities  no edema    Neuro  gait and station normal, no tremor    Psych  appropriate, pleasant, calm      ASSESSMENT AND RECOMMENDATIONS    1. " "Hypothyroidism, acquired, autoimmune           Clinically euthyroid taking about 37.5 µg levothyroxine daily. She felt 75 µg was too much because apparently loss so she started cutting it in half a few months ago.           Recent TSH 1.9 to satisfactory. I will not change the dose.  - levothyroxine (SYNTHROID) 75 MCG Tab; One half tablet each morning empty stomach  Dispense: 30 Tab; Refill: 6    2. Multiple thyroid nodules            Thyroid gland is asymptomatic. Patient is not aware of any change in her gland. No discomfort. No difficulty swallowing, breathing, or voice change.            Current thyroid ultrasound in April indicates that gland is markedly heterogeneous which is consistent with thyroiditis. The 11 mm nodule in the left lobe is unchanged. The nodule in the right lobe was not identified as such which is probably related to her thyroiditis. Previous FNA biopsy of both nodules was \"inadequate\". I don't think we need to repeat FNA            Recommend repeat thyroid ultrasound in one year.      DISPOSITION:   One-year follow-up       Fede Coello M.D.    Copies to: Henry Jacob, A.P.N. [phone]  "

## 2018-07-24 ENCOUNTER — HOSPITAL ENCOUNTER (OUTPATIENT)
Dept: RADIOLOGY | Facility: MEDICAL CENTER | Age: 58
End: 2018-07-24
Attending: FAMILY MEDICINE
Payer: COMMERCIAL

## 2018-07-24 ENCOUNTER — TELEPHONE (OUTPATIENT)
Dept: MEDICAL GROUP | Facility: PHYSICIAN GROUP | Age: 58
End: 2018-07-24

## 2018-07-24 ENCOUNTER — OFFICE VISIT (OUTPATIENT)
Dept: MEDICAL GROUP | Facility: PHYSICIAN GROUP | Age: 58
End: 2018-07-24
Payer: COMMERCIAL

## 2018-07-24 VITALS
SYSTOLIC BLOOD PRESSURE: 116 MMHG | TEMPERATURE: 98 F | BODY MASS INDEX: 23.46 KG/M2 | HEIGHT: 68 IN | OXYGEN SATURATION: 59 % | RESPIRATION RATE: 20 BRPM | DIASTOLIC BLOOD PRESSURE: 82 MMHG | WEIGHT: 154.8 LBS | HEART RATE: 95 BPM

## 2018-07-24 DIAGNOSIS — R22.32 MASS OF WRIST, LEFT: ICD-10-CM

## 2018-07-24 PROCEDURE — 73110 X-RAY EXAM OF WRIST: CPT | Mod: LT

## 2018-07-24 PROCEDURE — 99214 OFFICE O/P EST MOD 30 MIN: CPT | Performed by: FAMILY MEDICINE

## 2018-07-24 NOTE — PROGRESS NOTES
Chief Complaint   Patient presents with   • Cyst     top left wrist x 6 months       HISTORY OF PRESENT ILLNESS: Patient is a 58 y.o. female established patient here today for the following concerns:    1. Mass of wrist, left  Vivi is a pleasant 58 year old female here today with concern over left dorsal wrist mass for the last year.  Reports that it has been expanding more and limiting her ROM.  It is tender.  In February attempted to have it drained but it quickly within a month had re-accumulated.  She would like to have it treated now.        Past Medical, Social, and Family history reviewed and updated in EPIC    Allergies:Patient has no known allergies.    Current Outpatient Prescriptions   Medication Sig Dispense Refill   • levothyroxine (SYNTHROID) 75 MCG Tab One half tablet each morning empty stomach 30 Tab 6   • atorvastatin (LIPITOR) 80 MG tablet TAKE 1 TABLET BY MOUTH EVERY EVENING 90 Tab 1   • celecoxib (CELEBREX) 200 MG Cap Take 1 Cap by mouth every day. 90 Cap 1   • Nutritional Supplements (ESTROVEN PO) Take  by mouth.       No current facility-administered medications for this visit.          ROS:  Review of Systems   Constitutional: Negative for fever, chills, weight loss and malaise/fatigue.   HENT: Negative for ear pain, nosebleeds, congestion, sore throat and neck pain.    Eyes: Negative for blurred vision.   Respiratory: Negative for cough, sputum production, shortness of breath and wheezing.    Cardiovascular: Negative for chest pain, palpitations,  and leg swelling.   Gastrointestinal: Negative for heartburn, nausea, vomiting, diarrhea and abdominal pain.   Genitourinary: Negative for dysuria, urgency and frequency.   Musculoskeletal: Negative for myalgias, back pain and joint pain.   Skin: Negative for rash and itching.   Neurological: Negative for dizziness, tingling, tremors, sensory change, focal weakness and headaches.   Endo/Heme/Allergies: Does not bruise/bleed easily.  "  Psychiatric/Behavioral: Negative for depression, anxiety, suicidal ideas, insomnia and memory loss.      Exam:  Blood pressure 116/82, pulse 95, temperature 36.7 °C (98 °F), resp. rate 20, height 1.727 m (5' 8\"), weight 70.2 kg (154 lb 12.8 oz), SpO2 (!) 59 %.    General:  Well nourished, well developed in NAD  Head is grossly normal.  Neck: Supple without JVD   Pulmonary:  Normal effort.   Cardiovascular: Regular rate  Extremities: no clubbing, cyanosis, or edema.  Psych: affect appropriate  MSK: left wrist with dorsal cystic lesion more prominent with dorsiflexion.      Please note that this dictation was created using voice recognition software. I have made every reasonable attempt to correct obvious errors, but I expect that there are errors of grammar and possibly content that I did not discover before finalizing the note.    Assessment/Plan:  1. Mass of wrist, left  Will obtain xrays to help evaluate joint involvement.  We will also get consultation with ortho hand for excisional biopsy if indicated.    - DX-WRIST-COMPLETE 3+ LEFT; Future  - REFERRAL TO HAND SURGERY            "

## 2018-07-25 NOTE — TELEPHONE ENCOUNTER
----- Message from Analilia Daniels M.D. sent at 7/24/2018  5:16 PM PDT -----  Soft tissue mass shows no concerning calcifications, recommend follow up with ortho when convenient.

## 2018-08-28 DIAGNOSIS — M15.9 PRIMARY OSTEOARTHRITIS INVOLVING MULTIPLE JOINTS: ICD-10-CM

## 2018-08-28 DIAGNOSIS — E06.3 HYPOTHYROIDISM, ACQUIRED, AUTOIMMUNE: ICD-10-CM

## 2018-08-29 RX ORDER — LEVOTHYROXINE SODIUM 0.07 MG/1
TABLET ORAL
Refills: 0 | OUTPATIENT
Start: 2018-08-29

## 2018-08-29 RX ORDER — CELECOXIB 200 MG/1
CAPSULE ORAL
Qty: 90 CAP | Refills: 1 | Status: SHIPPED | OUTPATIENT
Start: 2018-08-29 | End: 2018-12-05 | Stop reason: SDUPTHER

## 2018-08-29 NOTE — TELEPHONE ENCOUNTER
Was the patient seen in the last year in this department? Yes    Does patient have an active prescription for medications requested? No     Received Request Via: Pharmacy      Pt met protocol?: Yes    OV  7/18

## 2018-11-27 DIAGNOSIS — E06.3 HYPOTHYROIDISM, ACQUIRED, AUTOIMMUNE: ICD-10-CM

## 2018-11-27 DIAGNOSIS — E04.2 MULTIPLE THYROID NODULES: ICD-10-CM

## 2018-11-29 RX ORDER — LEVOTHYROXINE SODIUM 0.07 MG/1
TABLET ORAL
Qty: 90 TAB | Refills: 1 | OUTPATIENT
Start: 2018-11-29

## 2018-11-29 RX ORDER — LEVOTHYROXINE SODIUM 0.07 MG/1
TABLET ORAL
Qty: 90 TAB | Refills: 1 | Status: SHIPPED | OUTPATIENT
Start: 2018-11-29 | End: 2018-12-05 | Stop reason: SDUPTHER

## 2018-11-29 NOTE — TELEPHONE ENCOUNTER
Was the patient seen in the last year in this department? Yes    Does patient have an active prescription for medications requested? No     Received Request Via: Pharmacy    Pt met protocol?: Yes     Last OV 07/2018  TSH   Date Value Ref Range Status   02/02/2018 1.920 0.380 - 5.330 uIU/mL Final     Comment:     Please note new reference ranges effective 12/14/2017 10:00 AM  Pregnant Females, 1st Trimester  0.050-3.700  Pregnant Females, 2nd Trimester  0.310-4.350  Pregnant Females, 3rd Trimester  0.410-5.180

## 2018-12-05 ENCOUNTER — HOSPITAL ENCOUNTER (OUTPATIENT)
Dept: RADIOLOGY | Facility: MEDICAL CENTER | Age: 58
End: 2018-12-05
Attending: NURSE PRACTITIONER
Payer: COMMERCIAL

## 2018-12-05 ENCOUNTER — OFFICE VISIT (OUTPATIENT)
Dept: MEDICAL GROUP | Facility: PHYSICIAN GROUP | Age: 58
End: 2018-12-05
Payer: COMMERCIAL

## 2018-12-05 VITALS
HEART RATE: 61 BPM | DIASTOLIC BLOOD PRESSURE: 60 MMHG | OXYGEN SATURATION: 96 % | SYSTOLIC BLOOD PRESSURE: 108 MMHG | HEIGHT: 68 IN | WEIGHT: 152 LBS | RESPIRATION RATE: 16 BRPM | TEMPERATURE: 98.6 F | BODY MASS INDEX: 23.04 KG/M2

## 2018-12-05 DIAGNOSIS — E03.9 HYPOTHYROIDISM, UNSPECIFIED TYPE: ICD-10-CM

## 2018-12-05 DIAGNOSIS — E04.2 MULTIPLE THYROID NODULES: ICD-10-CM

## 2018-12-05 DIAGNOSIS — M79.672 LEFT FOOT PAIN: ICD-10-CM

## 2018-12-05 DIAGNOSIS — N28.89 OTHER SPECIFIED DISORDERS OF KIDNEY AND URETER: ICD-10-CM

## 2018-12-05 DIAGNOSIS — N28.89 RENAL MASS: ICD-10-CM

## 2018-12-05 DIAGNOSIS — E78.5 HYPERLIPIDEMIA, UNSPECIFIED HYPERLIPIDEMIA TYPE: ICD-10-CM

## 2018-12-05 DIAGNOSIS — M15.9 PRIMARY OSTEOARTHRITIS INVOLVING MULTIPLE JOINTS: ICD-10-CM

## 2018-12-05 DIAGNOSIS — R79.89 LOW VITAMIN D LEVEL: ICD-10-CM

## 2018-12-05 DIAGNOSIS — E06.3 HYPOTHYROIDISM, ACQUIRED, AUTOIMMUNE: ICD-10-CM

## 2018-12-05 DIAGNOSIS — E78.2 MIXED HYPERLIPIDEMIA: ICD-10-CM

## 2018-12-05 PROBLEM — Z76.89 ENCOUNTER TO ESTABLISH CARE WITH NEW DOCTOR: Status: RESOLVED | Noted: 2018-04-02 | Resolved: 2018-12-05

## 2018-12-05 PROCEDURE — 99214 OFFICE O/P EST MOD 30 MIN: CPT | Performed by: NURSE PRACTITIONER

## 2018-12-05 PROCEDURE — 73620 X-RAY EXAM OF FOOT: CPT | Mod: LT

## 2018-12-05 RX ORDER — CELECOXIB 200 MG/1
200 CAPSULE ORAL
Qty: 90 CAP | Refills: 3 | Status: SHIPPED | OUTPATIENT
Start: 2018-12-05 | End: 2020-03-03 | Stop reason: SDUPTHER

## 2018-12-05 RX ORDER — LEVOTHYROXINE SODIUM 0.07 MG/1
TABLET ORAL
Qty: 90 TAB | Refills: 3 | Status: SHIPPED | OUTPATIENT
Start: 2018-12-05 | End: 2020-01-16 | Stop reason: SDUPTHER

## 2018-12-05 RX ORDER — ATORVASTATIN CALCIUM 80 MG/1
TABLET, FILM COATED ORAL
Qty: 90 TAB | Refills: 3 | Status: SHIPPED | OUTPATIENT
Start: 2018-12-05 | End: 2018-12-26

## 2018-12-05 NOTE — PROGRESS NOTES
Chief Complaint   Patient presents with   • Pain     (L) Foot    • Other     Concerned about kidneys        HISTORY OF PRESENT ILLNESS: Patient is a 58 y.o. female established patient who presents today to discuss the following issues:    Renal mass  Has a renal mass that was seen on ultrasound, and an MRI was recommended for further evaluation.  She never had the MRI done and is requesting another ultrasound.  We discussed options, and she agrees to proceed with the MRI.    Left foot pain  Reports that she hit her left foot very hard against something about 8 months ago.  She has had a bump ever since, and she has been having pain in the arch of that foot as well.  She wants a test to make sure that she doesn't have bone cancer.  Discussed plan to proceed with an xray to rule out old fracture.      Patient Active Problem List    Diagnosis Date Noted   • Left foot pain 12/05/2018   • Renal mass 04/23/2018   • Ganglion cyst 04/23/2018   • Abnormal finding on imaging 04/02/2018   • Hypothyroidism, acquired, autoimmune 03/30/2016   • Multiple thyroid nodules 08/12/2015   • Nasal sinus cyst 08/12/2015   • Bilateral shoulder pain 08/12/2015   • Fatigue 07/24/2015   • Family history of heart disease in female family member before age 65 07/24/2015   • Hyperlipidemia 10/21/2014   • S/P laminectomy 10/21/2014   • Osteopenia 10/21/2014       Allergies:Patient has no known allergies.    Current Outpatient Prescriptions   Medication Sig Dispense Refill   • atorvastatin (LIPITOR) 80 MG tablet TAKE 1 TABLET BY MOUTH EVERY EVENING 90 Tab 3   • celecoxib (CELEBREX) 200 MG Cap Take 1 Cap by mouth every day. 90 Cap 3   • levothyroxine (SYNTHROID) 75 MCG Tab One half tablet each morning empty stomach 90 Tab 3   • Nutritional Supplements (ESTROVEN PO) Take  by mouth.       No current facility-administered medications for this visit.        Social History   Substance Use Topics   • Smoking status: Never Smoker   • Smokeless tobacco:  "Never Used   • Alcohol use No       Family Status   Relation Status   • Mo Alive   • Fa    • Neg Hx (Not Specified)     Family History   Problem Relation Age of Onset   • Heart Disease Mother    • Other Father         cirrhosis   • Diabetes Neg Hx    • Stroke Neg Hx        Review of Systems:   Constitutional: Negative for fever, chills, weight loss and malaise/fatigue.   HENT: Negative for ear pain, nosebleeds, congestion, sore throat and neck pain.    Eyes: Negative for blurred vision.   Respiratory: Negative for cough, sputum production, shortness of breath and wheezing.    Cardiovascular: Negative for chest pain, palpitations, orthopnea and leg swelling.   Gastrointestinal: Negative for heartburn, nausea, vomiting and abdominal pain.   Genitourinary: Negative for dysuria, urgency and frequency.   Musculoskeletal: Negative for myalgias, joint pain, and back pain.  Positive for left foot pain.  Skin: Negative for rash and itching.   Neurological: Negative for dizziness, tingling, tremors, sensory change, focal weakness and headaches.   Endo/Heme/Allergies: Does not bruise/bleed easily.   Psychiatric/Behavioral: Negative for depression, suicidal ideas and memory loss.  The patient is not nervous/anxious and does not have insomnia.    All other systems reviewed and are negative except as in HPI.    Exam:  Blood pressure 108/60, pulse 61, temperature 37 °C (98.6 °F), resp. rate 16, height 1.727 m (5' 8\"), weight 68.9 kg (152 lb), SpO2 96 %.  General:  Well nourished, well developed female in NAD  Head: Grossly normal.  Neck: Supple without JVD or bruit. Thyroid is not enlarged.  Pulmonary: Clear to ausculation. Normal effort. No rales, ronchi, or wheezing.  Cardiovascular: Regular rate and rhythm without murmur.   Abdomen:  Soft, nontender, nondistended.  Extremities: No clubbing, cyanosis, or edema. Left foot nontender.  Skin: Intact with no obvious rashes or lesions.  Neuro: Grossly intact.  Psych: Alert and " oriented x 3.  Mood and affect appropriate.    Medical decision-making and discussion: Vivi is here today to discuss a few issues.  Lab work, MRI, and Xray were ordered, and her medications were refilled.  She will follow-up here as needed.          Assessment/Plan:  1. Other specified disorders of kidney and ureter  MR-ABDOMEN-WITH & W/O   2. Left foot pain  DX-FOOT-2- LEFT   3. Hypothyroidism, unspecified type  TSH   4. Low vitamin D level  VITAMIN D,25 HYDROXY   5. Hyperlipidemia, unspecified hyperlipidemia type  CBC WITH DIFFERENTIAL    COMP METABOLIC PANEL    Lipid Profile   6. Renal mass  COMP METABOLIC PANEL    URINALYSIS,CULTURE IF INDICATED   7. Mixed hyperlipidemia  atorvastatin (LIPITOR) 80 MG tablet   8. Primary osteoarthritis involving multiple joints  celecoxib (CELEBREX) 200 MG Cap   9. Hypothyroidism, acquired, autoimmune  levothyroxine (SYNTHROID) 75 MCG Tab   10. Multiple thyroid nodules  levothyroxine (SYNTHROID) 75 MCG Tab       Return if symptoms worsen or fail to improve.    Please note that this dictation was created using voice recognition software. I have made every reasonable attempt to correct obvious errors, but I expect that there are errors of grammar and possibly content that I did not discover before finalizing the note.

## 2018-12-06 ENCOUNTER — HOSPITAL ENCOUNTER (OUTPATIENT)
Dept: LAB | Facility: MEDICAL CENTER | Age: 58
End: 2018-12-06
Attending: NURSE PRACTITIONER
Payer: COMMERCIAL

## 2018-12-06 DIAGNOSIS — R79.89 LOW VITAMIN D LEVEL: ICD-10-CM

## 2018-12-06 DIAGNOSIS — E03.9 HYPOTHYROIDISM, UNSPECIFIED TYPE: ICD-10-CM

## 2018-12-06 DIAGNOSIS — N28.89 RENAL MASS: ICD-10-CM

## 2018-12-06 DIAGNOSIS — E78.5 HYPERLIPIDEMIA, UNSPECIFIED HYPERLIPIDEMIA TYPE: ICD-10-CM

## 2018-12-06 LAB
25(OH)D3 SERPL-MCNC: 20 NG/ML (ref 30–100)
ALBUMIN SERPL BCP-MCNC: 4.5 G/DL (ref 3.2–4.9)
ALBUMIN/GLOB SERPL: 1.6 G/DL
ALP SERPL-CCNC: 77 U/L (ref 30–99)
ALT SERPL-CCNC: 19 U/L (ref 2–50)
ANION GAP SERPL CALC-SCNC: 7 MMOL/L (ref 0–11.9)
APPEARANCE UR: CLEAR
AST SERPL-CCNC: 19 U/L (ref 12–45)
BASOPHILS # BLD AUTO: 1.1 % (ref 0–1.8)
BASOPHILS # BLD: 0.05 K/UL (ref 0–0.12)
BILIRUB SERPL-MCNC: 0.9 MG/DL (ref 0.1–1.5)
BILIRUB UR QL STRIP.AUTO: NEGATIVE
BUN SERPL-MCNC: 16 MG/DL (ref 8–22)
CALCIUM SERPL-MCNC: 9.6 MG/DL (ref 8.5–10.5)
CHLORIDE SERPL-SCNC: 106 MMOL/L (ref 96–112)
CHOLEST SERPL-MCNC: 171 MG/DL (ref 100–199)
CO2 SERPL-SCNC: 30 MMOL/L (ref 20–33)
COLOR UR: YELLOW
CREAT SERPL-MCNC: 0.84 MG/DL (ref 0.5–1.4)
EOSINOPHIL # BLD AUTO: 0.04 K/UL (ref 0–0.51)
EOSINOPHIL NFR BLD: 0.8 % (ref 0–6.9)
ERYTHROCYTE [DISTWIDTH] IN BLOOD BY AUTOMATED COUNT: 38.9 FL (ref 35.9–50)
FASTING STATUS PATIENT QL REPORTED: NORMAL
GLOBULIN SER CALC-MCNC: 2.8 G/DL (ref 1.9–3.5)
GLUCOSE SERPL-MCNC: 89 MG/DL (ref 65–99)
GLUCOSE UR STRIP.AUTO-MCNC: NEGATIVE MG/DL
HCT VFR BLD AUTO: 41.5 % (ref 37–47)
HDLC SERPL-MCNC: 62 MG/DL
HGB BLD-MCNC: 13.2 G/DL (ref 12–16)
IMM GRANULOCYTES # BLD AUTO: 0.01 K/UL (ref 0–0.11)
IMM GRANULOCYTES NFR BLD AUTO: 0.2 % (ref 0–0.9)
KETONES UR STRIP.AUTO-MCNC: NEGATIVE MG/DL
LDLC SERPL CALC-MCNC: 93 MG/DL
LEUKOCYTE ESTERASE UR QL STRIP.AUTO: NEGATIVE
LYMPHOCYTES # BLD AUTO: 1.9 K/UL (ref 1–4.8)
LYMPHOCYTES NFR BLD: 40.3 % (ref 22–41)
MCH RBC QN AUTO: 27.8 PG (ref 27–33)
MCHC RBC AUTO-ENTMCNC: 31.8 G/DL (ref 33.6–35)
MCV RBC AUTO: 87.6 FL (ref 81.4–97.8)
MICRO URNS: NORMAL
MONOCYTES # BLD AUTO: 0.32 K/UL (ref 0–0.85)
MONOCYTES NFR BLD AUTO: 6.8 % (ref 0–13.4)
NEUTROPHILS # BLD AUTO: 2.39 K/UL (ref 2–7.15)
NEUTROPHILS NFR BLD: 50.8 % (ref 44–72)
NITRITE UR QL STRIP.AUTO: NEGATIVE
NRBC # BLD AUTO: 0 K/UL
NRBC BLD-RTO: 0 /100 WBC
PH UR STRIP.AUTO: 7 [PH]
PLATELET # BLD AUTO: 227 K/UL (ref 164–446)
PMV BLD AUTO: 10.3 FL (ref 9–12.9)
POTASSIUM SERPL-SCNC: 4.4 MMOL/L (ref 3.6–5.5)
PROT SERPL-MCNC: 7.3 G/DL (ref 6–8.2)
PROT UR QL STRIP: NEGATIVE MG/DL
RBC # BLD AUTO: 4.74 M/UL (ref 4.2–5.4)
RBC UR QL AUTO: NEGATIVE
SODIUM SERPL-SCNC: 143 MMOL/L (ref 135–145)
SP GR UR STRIP.AUTO: 1.01
TRIGL SERPL-MCNC: 78 MG/DL (ref 0–149)
TSH SERPL DL<=0.005 MIU/L-ACNC: 3.09 UIU/ML (ref 0.38–5.33)
UROBILINOGEN UR STRIP.AUTO-MCNC: 0.2 MG/DL
WBC # BLD AUTO: 4.7 K/UL (ref 4.8–10.8)

## 2018-12-06 PROCEDURE — 81003 URINALYSIS AUTO W/O SCOPE: CPT

## 2018-12-06 PROCEDURE — 80053 COMPREHEN METABOLIC PANEL: CPT

## 2018-12-06 PROCEDURE — 84443 ASSAY THYROID STIM HORMONE: CPT

## 2018-12-06 PROCEDURE — 85025 COMPLETE CBC W/AUTO DIFF WBC: CPT

## 2018-12-06 PROCEDURE — 80061 LIPID PANEL: CPT

## 2018-12-06 PROCEDURE — 82306 VITAMIN D 25 HYDROXY: CPT

## 2018-12-06 PROCEDURE — 36415 COLL VENOUS BLD VENIPUNCTURE: CPT

## 2018-12-06 NOTE — ASSESSMENT & PLAN NOTE
Reports that she hit her left foot very hard against something about 8 months ago.  She has had a bump ever since, and she has been having pain in the arch of that foot as well.  She wants a test to make sure that she doesn't have bone cancer.  Discussed plan to proceed with an xray to rule out old fracture.

## 2018-12-06 NOTE — ASSESSMENT & PLAN NOTE
Has a renal mass that was seen on ultrasound, and an MRI was recommended for further evaluation.  She never had the MRI done and is requesting another ultrasound.  We discussed options, and she agrees to proceed with the MRI.

## 2018-12-24 ENCOUNTER — HOSPITAL ENCOUNTER (OUTPATIENT)
Dept: RADIOLOGY | Facility: MEDICAL CENTER | Age: 58
End: 2018-12-24
Attending: NURSE PRACTITIONER
Payer: COMMERCIAL

## 2018-12-24 DIAGNOSIS — N28.89 OTHER SPECIFIED DISORDERS OF KIDNEY AND URETER: ICD-10-CM

## 2018-12-24 PROCEDURE — A9585 GADOBUTROL INJECTION: HCPCS | Performed by: NURSE PRACTITIONER

## 2018-12-24 PROCEDURE — 700117 HCHG RX CONTRAST REV CODE 255: Performed by: NURSE PRACTITIONER

## 2018-12-24 PROCEDURE — 74183 MRI ABD W/O CNTR FLWD CNTR: CPT

## 2018-12-24 RX ORDER — GADOBUTROL 604.72 MG/ML
7 INJECTION INTRAVENOUS ONCE
Status: COMPLETED | OUTPATIENT
Start: 2018-12-24 | End: 2018-12-24

## 2018-12-24 RX ADMIN — GADOBUTROL 7 ML: 604.72 INJECTION INTRAVENOUS at 11:14

## 2018-12-26 ENCOUNTER — OFFICE VISIT (OUTPATIENT)
Dept: MEDICAL GROUP | Facility: PHYSICIAN GROUP | Age: 58
End: 2018-12-26
Payer: COMMERCIAL

## 2018-12-26 VITALS
SYSTOLIC BLOOD PRESSURE: 120 MMHG | HEART RATE: 61 BPM | DIASTOLIC BLOOD PRESSURE: 70 MMHG | WEIGHT: 151 LBS | OXYGEN SATURATION: 94 % | BODY MASS INDEX: 22.88 KG/M2 | RESPIRATION RATE: 16 BRPM | HEIGHT: 68 IN | TEMPERATURE: 97.5 F

## 2018-12-26 DIAGNOSIS — E78.5 HYPERLIPIDEMIA, UNSPECIFIED HYPERLIPIDEMIA TYPE: ICD-10-CM

## 2018-12-26 DIAGNOSIS — R79.89 LOW VITAMIN D LEVEL: ICD-10-CM

## 2018-12-26 DIAGNOSIS — Z78.0 POSTMENOPAUSAL: ICD-10-CM

## 2018-12-26 DIAGNOSIS — N28.89 RENAL MASS: ICD-10-CM

## 2018-12-26 DIAGNOSIS — M79.672 LEFT FOOT PAIN: ICD-10-CM

## 2018-12-26 PROCEDURE — 99214 OFFICE O/P EST MOD 30 MIN: CPT | Performed by: NURSE PRACTITIONER

## 2018-12-26 RX ORDER — ERGOCALCIFEROL 1.25 MG/1
CAPSULE ORAL
Qty: 16 CAP | Refills: 0 | Status: SHIPPED | OUTPATIENT
Start: 2018-12-26 | End: 2020-03-05

## 2018-12-26 RX ORDER — ATORVASTATIN CALCIUM 20 MG/1
20 TABLET, FILM COATED ORAL DAILY
Qty: 90 TAB | Refills: 3 | Status: SHIPPED | OUTPATIENT
Start: 2018-12-26 | End: 2020-02-19 | Stop reason: SDUPTHER

## 2018-12-26 NOTE — PROGRESS NOTES
Chief Complaint   Patient presents with   • Results     Labs        HISTORY OF PRESENT ILLNESS: Patient is a 58 y.o. 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  Discussed there is no suspicion for malignancy.    Left foot pain  Reviewed recent xray.  Was negative for fracture, positive for bone demineralization.  Will proceed with DEXA.    Low vitamin D level  Reviewed recent lab results.  Vit D is low.  Rx for ergocalciferol was sent to her pharmacy.    Hyperlipidemia  Chronic in nature.  Labs reviewed.  Stable on meds.  Has been taking 1/4 of her Lipitor 80 mg.  Will send RX for Lipitor 20 to her pharmacy.      Patient Active Problem List    Diagnosis Date Noted   • Low vitamin D level 12/26/2018   • Left foot pain 12/05/2018   • Renal mass 04/23/2018   • Ganglion cyst 04/23/2018   • Abnormal finding on imaging 04/02/2018   • Hypothyroidism, acquired, autoimmune 03/30/2016   • Multiple thyroid nodules 08/12/2015   • Nasal sinus cyst 08/12/2015   • Bilateral shoulder pain 08/12/2015   • Fatigue 07/24/2015   • Family history of heart disease in female family member before age 65 07/24/2015   • Hyperlipidemia 10/21/2014   • S/P laminectomy 10/21/2014   • Osteopenia 10/21/2014       Allergies:Patient has no known allergies.    Current Outpatient Prescriptions   Medication Sig Dispense Refill   • ergocalciferol (DRISDOL) 77806 UNIT capsule Take 1 cap by mouth every day for 10 days, and then take 1 cap by mouth every Tuesday and Saturday until prescription is completed. 16 Cap 0   • atorvastatin (LIPITOR) 20 MG Tab Take 1 Tab by mouth every day. 90 Tab 3   • celecoxib (CELEBREX) 200 MG Cap Take 1 Cap by mouth every day. 90 Cap 3   • levothyroxine (SYNTHROID) 75 MCG Tab One half tablet each morning empty stomach 90 Tab 3   • Nutritional Supplements (ESTROVEN PO) Take  by mouth.       No current  "facility-administered medications for this visit.        Social History   Substance Use Topics   • Smoking status: Never Smoker   • Smokeless tobacco: Never Used   • Alcohol use No       Family Status   Relation Status   • Mo Alive   • Fa    • Neg Hx (Not Specified)     Family History   Problem Relation Age of Onset   • Heart Disease Mother    • Other Father         cirrhosis   • Diabetes Neg Hx    • Stroke Neg Hx        Review of Systems:   Constitutional: Negative for fever, chills, weight loss and malaise/fatigue.   HENT: Negative for ear pain, nosebleeds, congestion, sore throat and neck pain.    Eyes: Negative for blurred vision.   Respiratory: Negative for cough, sputum production, shortness of breath and wheezing.    Cardiovascular: Negative for chest pain, palpitations, orthopnea and leg swelling.   Gastrointestinal: Negative for heartburn, nausea, vomiting and abdominal pain.   Genitourinary: Negative for dysuria, urgency and frequency.   Musculoskeletal: Negative for myalgias, joint pain, and back pain. Positive for left foot pain.  Skin: Negative for rash and itching.   Neurological: Negative for dizziness, tingling, tremors, sensory change, focal weakness and headaches.   Endo/Heme/Allergies: Does not bruise/bleed easily.   Psychiatric/Behavioral: Negative for depression, suicidal ideas and memory loss.  The patient is not nervous/anxious and does not have insomnia.    All other systems reviewed and are negative except as in HPI.    Exam:  Blood pressure 120/70, pulse 61, temperature 36.4 °C (97.5 °F), resp. rate 16, height 1.727 m (5' 8\"), weight 68.5 kg (151 lb), SpO2 94 %.  General:  Well nourished, well developed female in NAD  Head: Grossly normal.  Neck: Supple without JVD or bruit. Thyroid is not enlarged.  Pulmonary: Clear to ausculation. Normal effort. No rales, ronchi, or wheezing.  Cardiovascular: Regular rate and rhythm without murmur.   Abdomen:  Soft, nontender, " nondistended.  Extremities: No clubbing, cyanosis, or edema.  Skin: Intact with no obvious rashes or lesions.  Neuro: Grossly intact.  Psych: Alert and oriented x 3.  Mood and affect appropriate.    Medical decision-making and discussion: Vivi is here today for follow-up.  We reviewed MRI, xray, and lab results.  A DEXA was ordered and prescriptions for atorvastatin and ergocalciferol were sent to her pharmacy.  She will follow-up here as needed.          Assessment/Plan:  1. Postmenopausal  DS-BONE DENSITY STUDY (DEXA)   2. Low vitamin D level  ergocalciferol (DRISDOL) 14747 UNIT capsule   3. Hyperlipidemia, unspecified hyperlipidemia type  atorvastatin (LIPITOR) 20 MG Tab   4. Renal mass     5. Left foot pain         Return if symptoms worsen or fail to improve.    Please note that this dictation was created using voice recognition software. I have made every reasonable attempt to correct obvious errors, but I expect that there are errors of grammar and possibly content that I did not discover before finalizing the note.

## 2018-12-26 NOTE — ASSESSMENT & PLAN NOTE
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  Discussed there is no suspicion for malignancy.

## 2018-12-26 NOTE — ASSESSMENT & PLAN NOTE
Chronic in nature.  Labs reviewed.  Stable on meds.  Has been taking 1/4 of her Lipitor 80 mg.  Will send RX for Lipitor 20 to her pharmacy.

## 2018-12-26 NOTE — ASSESSMENT & PLAN NOTE
Reviewed recent xray.  Was negative for fracture, positive for bone demineralization.  Will proceed with DEXA.

## 2019-10-21 ENCOUNTER — TELEPHONE (OUTPATIENT)
Dept: ENDOCRINOLOGY | Facility: MEDICAL CENTER | Age: 59
End: 2019-10-21

## 2019-10-21 DIAGNOSIS — E04.2 MULTIPLE THYROID NODULES: ICD-10-CM

## 2019-10-21 DIAGNOSIS — E06.3 HYPOTHYROIDISM, ACQUIRED, AUTOIMMUNE: ICD-10-CM

## 2019-10-21 NOTE — TELEPHONE ENCOUNTER
1. Caller Name: Vivi Sommer                                         Call Back Number: [phone] (home) [phone] (work)      Patient approves a detailed voicemail message: yes    Pt requesting an order to be placed for Throid U/S and Thyroid labs. She will need us to call her when those orders have been placed. She will call once she has them done and make an appointment to go over the results with Dr. Coello. She get her labs and imaging done at [facility].

## 2019-10-24 ENCOUNTER — HOSPITAL ENCOUNTER (OUTPATIENT)
Dept: LAB | Facility: MEDICAL CENTER | Age: 59
End: 2019-10-24
Attending: INTERNAL MEDICINE
Payer: COMMERCIAL

## 2019-10-24 DIAGNOSIS — E06.3 HYPOTHYROIDISM, ACQUIRED, AUTOIMMUNE: ICD-10-CM

## 2019-10-24 LAB
T4 FREE SERPL-MCNC: 0.7 NG/DL (ref 0.53–1.43)
TSH SERPL DL<=0.005 MIU/L-ACNC: 2.99 UIU/ML (ref 0.38–5.33)

## 2019-10-24 PROCEDURE — 84439 ASSAY OF FREE THYROXINE: CPT

## 2019-10-24 PROCEDURE — 36415 COLL VENOUS BLD VENIPUNCTURE: CPT

## 2019-10-24 PROCEDURE — 84443 ASSAY THYROID STIM HORMONE: CPT

## 2019-10-28 ENCOUNTER — HOSPITAL ENCOUNTER (OUTPATIENT)
Dept: RADIOLOGY | Facility: MEDICAL CENTER | Age: 59
End: 2019-10-28
Attending: INTERNAL MEDICINE
Payer: COMMERCIAL

## 2019-10-28 DIAGNOSIS — E04.2 MULTIPLE THYROID NODULES: ICD-10-CM

## 2019-10-28 PROCEDURE — 76536 US EXAM OF HEAD AND NECK: CPT

## 2019-11-04 ENCOUNTER — OFFICE VISIT (OUTPATIENT)
Dept: ENDOCRINOLOGY | Facility: MEDICAL CENTER | Age: 59
End: 2019-11-04
Payer: COMMERCIAL

## 2019-11-04 VITALS
BODY MASS INDEX: 23.37 KG/M2 | HEART RATE: 70 BPM | HEIGHT: 68 IN | SYSTOLIC BLOOD PRESSURE: 108 MMHG | WEIGHT: 154.2 LBS | DIASTOLIC BLOOD PRESSURE: 76 MMHG

## 2019-11-04 DIAGNOSIS — E04.2 MULTIPLE THYROID NODULES: ICD-10-CM

## 2019-11-04 DIAGNOSIS — E06.3 HYPOTHYROIDISM, ACQUIRED, AUTOIMMUNE: ICD-10-CM

## 2019-11-04 PROCEDURE — 99213 OFFICE O/P EST LOW 20 MIN: CPT | Performed by: INTERNAL MEDICINE

## 2019-11-04 NOTE — PROGRESS NOTES
"Chief Complaint   Patient presents with   • Hypothyroidism     Chronic autoimmune thyroiditis   • Follow-Up     Thyroid nodules        HPI:    See assessment and recommendations below    ROS:  Has a rare incidence of a brief faint like feeling lasting about 1 hour.  She does not lie down.  She sits down and has a cup of tea and then feels better.  She thought at one point it related to long airline flights to [place].      Allergies: No Known Allergies    Current medicines including changes today:  Current Outpatient Medications   Medication Sig Dispense Refill   • atorvastatin (LIPITOR) 20 MG Tab Take 1 Tab by mouth every day. 90 Tab 3   • celecoxib (CELEBREX) 200 MG Cap Take 1 Cap by mouth every day. 90 Cap 3   • levothyroxine (SYNTHROID) 75 MCG Tab One half tablet each morning empty stomach 90 Tab 3   • Nutritional Supplements (ESTROVEN PO) Take  by mouth.     • ergocalciferol (DRISDOL) 21296 UNIT capsule Take 1 cap by mouth every day for 10 days, and then take 1 cap by mouth every Tuesday and Saturday until prescription is completed. (Patient not taking: Reported on 11/4/2019) 16 Cap 0     No current facility-administered medications for this visit.         Past Medical History:   Diagnosis Date   • Chronic autoimmune thyroiditis      + US / + TPO =293   • Hyperlipidemia 10/21/2014   • Hypothyroidism    • Multiple thyroid nodules        PHYSICAL EXAM:    /76 (BP Location: Left arm, Patient Position: Sitting, BP Cuff Size: Adult)   Pulse 70   Ht 1.727 m (5' 7.99\")   Wt 69.9 kg (154 lb 3.2 oz)   BMI 23.45 kg/m²     Gen.   appears healthy     Skin   appropriate for sex and age    HEENT  unremarkable    Neck   small prominence in the left lobe of the thyroid that I think corresponds to the 1.2 cm nodule seen on ultrasound.  The right lobe is smaller without palpable nodules.  The gland is not tender.    Heart  regular    Extremities  no edema    Neuro  gait and station normal    Psych  " "appropriate    ASSESSMENT AND RECOMMENDATIONS    1. Multiple thyroid nodules            Formally had two distinct nodules - one in each lobe.  Attempts at FNA biopsy were \"insufficient\".            Current ultrasound defines a 1.2 cm nodule left lobe which is unchanged compared to 2016            No other nodules identified greater than 1 cm.  2 small nodules less than 5 mm are noted.    2. Hypothyroidism, acquired, autoimmune            Patient is clinically euthyroid taking 1/2 tablet 75 mcg levothyroxine daily.  She would like to stay with this dose.            Current TSH is 2.9 and free thyroxine 0.7 (0.5-1.4).  - FREE THYROXINE; Future  - TSH; Future    3.  Chronic autoimmune thyroiditis            Thyroid gland is asymptomatic. Patient is not aware of any change in her gland. No discomfort. No difficulty swallowing, breathing, or voice change.      DISPOSITION: No changes indicated.                            Return in 6 months and update lab      Fede Coello M.D.    Copies to: Henry Jacob, ORLYPServandoN. [phone]  "

## 2020-02-19 DIAGNOSIS — R79.89 LOW VITAMIN D LEVEL: ICD-10-CM

## 2020-02-19 DIAGNOSIS — E78.5 HYPERLIPIDEMIA, UNSPECIFIED HYPERLIPIDEMIA TYPE: ICD-10-CM

## 2020-02-19 RX ORDER — ATORVASTATIN CALCIUM 20 MG/1
20 TABLET, FILM COATED ORAL DAILY
Qty: 90 TAB | Refills: 0 | Status: SHIPPED | OUTPATIENT
Start: 2020-02-19 | End: 2020-03-05 | Stop reason: SDUPTHER

## 2020-02-19 NOTE — TELEPHONE ENCOUNTER
Was the patient seen in the last year in this department? No     Does patient have an active prescription for medications requested? No     Received Request Via: Pharmacy    Pt met protocol?: No     Last OV 12/26/2018    Lab Results   Component Value Date/Time    CHOLSTRLTOT 171 12/06/2018 1001    TRIGLYCERIDE 78 12/06/2018 1001    HDL 62 12/06/2018 1001    LDL 93 12/06/2018 1001

## 2020-02-21 ENCOUNTER — HOSPITAL ENCOUNTER (OUTPATIENT)
Dept: LAB | Facility: MEDICAL CENTER | Age: 60
End: 2020-02-21
Attending: NURSE PRACTITIONER
Payer: COMMERCIAL

## 2020-02-21 ENCOUNTER — HOSPITAL ENCOUNTER (OUTPATIENT)
Dept: LAB | Facility: MEDICAL CENTER | Age: 60
End: 2020-02-21
Attending: INTERNAL MEDICINE
Payer: COMMERCIAL

## 2020-02-21 DIAGNOSIS — E06.3 HYPOTHYROIDISM, ACQUIRED, AUTOIMMUNE: ICD-10-CM

## 2020-02-21 DIAGNOSIS — E78.5 HYPERLIPIDEMIA, UNSPECIFIED HYPERLIPIDEMIA TYPE: ICD-10-CM

## 2020-02-21 DIAGNOSIS — R79.89 LOW VITAMIN D LEVEL: ICD-10-CM

## 2020-02-21 LAB
25(OH)D3 SERPL-MCNC: 30 NG/ML (ref 30–100)
ALBUMIN SERPL BCP-MCNC: 4.7 G/DL (ref 3.2–4.9)
ALBUMIN/GLOB SERPL: 1.6 G/DL
ALP SERPL-CCNC: 59 U/L (ref 30–99)
ALT SERPL-CCNC: 23 U/L (ref 2–50)
ANION GAP SERPL CALC-SCNC: 4 MMOL/L (ref 0–11.9)
AST SERPL-CCNC: 21 U/L (ref 12–45)
BILIRUB SERPL-MCNC: 1 MG/DL (ref 0.1–1.5)
BUN SERPL-MCNC: 17 MG/DL (ref 8–22)
CALCIUM SERPL-MCNC: 9.7 MG/DL (ref 8.5–10.5)
CHLORIDE SERPL-SCNC: 105 MMOL/L (ref 96–112)
CHOLEST SERPL-MCNC: 174 MG/DL (ref 100–199)
CO2 SERPL-SCNC: 29 MMOL/L (ref 20–33)
CREAT SERPL-MCNC: 0.93 MG/DL (ref 0.5–1.4)
ERYTHROCYTE [DISTWIDTH] IN BLOOD BY AUTOMATED COUNT: 39.7 FL (ref 35.9–50)
FASTING STATUS PATIENT QL REPORTED: NORMAL
GLOBULIN SER CALC-MCNC: 2.9 G/DL (ref 1.9–3.5)
GLUCOSE SERPL-MCNC: 95 MG/DL (ref 65–99)
HCT VFR BLD AUTO: 39.5 % (ref 37–47)
HDLC SERPL-MCNC: 58 MG/DL
HGB BLD-MCNC: 13.1 G/DL (ref 12–16)
LDLC SERPL CALC-MCNC: 95 MG/DL
MCH RBC QN AUTO: 28.9 PG (ref 27–33)
MCHC RBC AUTO-ENTMCNC: 33.2 G/DL (ref 33.6–35)
MCV RBC AUTO: 87 FL (ref 81.4–97.8)
PLATELET # BLD AUTO: 198 K/UL (ref 164–446)
PMV BLD AUTO: 10.1 FL (ref 9–12.9)
POTASSIUM SERPL-SCNC: 4.2 MMOL/L (ref 3.6–5.5)
PROT SERPL-MCNC: 7.6 G/DL (ref 6–8.2)
RBC # BLD AUTO: 4.54 M/UL (ref 4.2–5.4)
SODIUM SERPL-SCNC: 138 MMOL/L (ref 135–145)
T4 FREE SERPL-MCNC: 0.74 NG/DL (ref 0.53–1.43)
TRIGL SERPL-MCNC: 107 MG/DL (ref 0–149)
TSH SERPL DL<=0.005 MIU/L-ACNC: 4.58 UIU/ML (ref 0.38–5.33)
WBC # BLD AUTO: 4 K/UL (ref 4.8–10.8)

## 2020-02-21 PROCEDURE — 85027 COMPLETE CBC AUTOMATED: CPT

## 2020-02-21 PROCEDURE — 80053 COMPREHEN METABOLIC PANEL: CPT

## 2020-02-21 PROCEDURE — 84443 ASSAY THYROID STIM HORMONE: CPT

## 2020-02-21 PROCEDURE — 80061 LIPID PANEL: CPT

## 2020-02-21 PROCEDURE — 82306 VITAMIN D 25 HYDROXY: CPT

## 2020-02-21 PROCEDURE — 36415 COLL VENOUS BLD VENIPUNCTURE: CPT

## 2020-02-21 PROCEDURE — 84439 ASSAY OF FREE THYROXINE: CPT

## 2020-03-02 DIAGNOSIS — M15.9 PRIMARY OSTEOARTHRITIS INVOLVING MULTIPLE JOINTS: ICD-10-CM

## 2020-03-02 NOTE — TELEPHONE ENCOUNTER
Was the patient seen in the last year in this department? No     Does patient have an active prescription for medications requested? No     Received Request Via: Pharmacy      Pt met protocol?: No   Last ov 12/26/18 has an upcoming appt 3/5/2020

## 2020-03-03 RX ORDER — CELECOXIB 200 MG/1
200 CAPSULE ORAL
Qty: 90 CAP | Refills: 0 | Status: SHIPPED | OUTPATIENT
Start: 2020-03-03 | End: 2020-06-12 | Stop reason: SDUPTHER

## 2020-03-05 ENCOUNTER — OFFICE VISIT (OUTPATIENT)
Dept: MEDICAL GROUP | Facility: PHYSICIAN GROUP | Age: 60
End: 2020-03-05
Payer: COMMERCIAL

## 2020-03-05 VITALS
SYSTOLIC BLOOD PRESSURE: 120 MMHG | WEIGHT: 157 LBS | DIASTOLIC BLOOD PRESSURE: 80 MMHG | RESPIRATION RATE: 16 BRPM | HEART RATE: 54 BPM | OXYGEN SATURATION: 94 % | HEIGHT: 68 IN | BODY MASS INDEX: 23.79 KG/M2 | TEMPERATURE: 98.3 F

## 2020-03-05 DIAGNOSIS — E78.5 HYPERLIPIDEMIA, UNSPECIFIED HYPERLIPIDEMIA TYPE: ICD-10-CM

## 2020-03-05 DIAGNOSIS — E06.3 HYPOTHYROIDISM, ACQUIRED, AUTOIMMUNE: ICD-10-CM

## 2020-03-05 DIAGNOSIS — E04.2 MULTIPLE THYROID NODULES: ICD-10-CM

## 2020-03-05 PROCEDURE — 99213 OFFICE O/P EST LOW 20 MIN: CPT | Performed by: NURSE PRACTITIONER

## 2020-03-05 RX ORDER — LEVOTHYROXINE SODIUM 0.07 MG/1
TABLET ORAL
Qty: 90 TAB | Refills: 3 | Status: SHIPPED | OUTPATIENT
Start: 2020-03-05 | End: 2021-02-11

## 2020-03-05 RX ORDER — CLINDAMYCIN HYDROCHLORIDE 150 MG/1
150 CAPSULE ORAL 3 TIMES DAILY
Qty: 21 CAP | Refills: 0 | Status: SHIPPED | OUTPATIENT
Start: 2020-03-05 | End: 2021-02-11

## 2020-03-05 RX ORDER — ATORVASTATIN CALCIUM 20 MG/1
20 TABLET, FILM COATED ORAL DAILY
Qty: 90 TAB | Refills: 3 | Status: SHIPPED | OUTPATIENT
Start: 2020-03-05 | End: 2021-02-11 | Stop reason: SDUPTHER

## 2020-03-05 ASSESSMENT — PATIENT HEALTH QUESTIONNAIRE - PHQ9: CLINICAL INTERPRETATION OF PHQ2 SCORE: 0

## 2020-03-05 ASSESSMENT — FIBROSIS 4 INDEX: FIB4 SCORE: 1.35

## 2020-03-05 NOTE — PROGRESS NOTES
Chief Complaint   Patient presents with   • Lab Results     FV   • Medication Refill       HISTORY OF PRESENT ILLNESS: Patient is a 59 y.o. female established patient who presents today to discuss the following issues:    Hyperlipidemia  Chronic in nature.  Stable on meds.  Due for refills.  Reviewed recent lab results.      Hypothyroidism, acquired, autoimmune  Reviewed recent lab results.  Chronic in nature.  Stable on meds.  Due for refills.      Patient Active Problem List    Diagnosis Date Noted   • Low vitamin D level 2018   • Left foot pain 2018   • Renal mass 2018   • Ganglion cyst 2018   • Abnormal finding on imaging 2018   • Hypothyroidism, acquired, autoimmune 2016   • Multiple thyroid nodules 2015   • Nasal sinus cyst 2015   • Bilateral shoulder pain 2015   • Fatigue 2015   • Family history of heart disease in female family member before age 65 2015   • Hyperlipidemia 10/21/2014   • S/P laminectomy 10/21/2014   • Osteopenia 10/21/2014       Allergies:Patient has no known allergies.    Current Outpatient Medications   Medication Sig Dispense Refill   • atorvastatin (LIPITOR) 20 MG Tab Take 1 Tab by mouth every day. 90 Tab 3   • levothyroxine (SYNTHROID) 75 MCG Tab Take one half tablet by mouth each morning on an empty stomach 90 Tab 3   • clindamycin (CLEOCIN) 150 MG Cap Take 1 Cap by mouth 3 times a day. 21 Cap 0   • celecoxib (CELEBREX) 200 MG Cap Take 1 Cap by mouth every day. 90 Cap 0   • Nutritional Supplements (ESTROVEN PO) Take  by mouth.       No current facility-administered medications for this visit.        Social History     Tobacco Use   • Smoking status: Never Smoker   • Smokeless tobacco: Never Used   Substance Use Topics   • Alcohol use: No     Alcohol/week: 0.0 oz   • Drug use: No       Family Status   Relation Name Status   • Mo  Alive   • Fa     • Neg Hx  (Not Specified)     Family History   Problem Relation Age  "of Onset   • Heart Disease Mother    • Other Father         cirrhosis   • Diabetes Neg Hx    • Stroke Neg Hx        Review of Systems:   Constitutional: Negative for fever, chills, weight loss and malaise/fatigue.   HENT: Negative for ear pain, nosebleeds, congestion, sore throat and neck pain.    Eyes: Negative for blurred vision.   Respiratory: Negative for cough, sputum production, shortness of breath and wheezing.    Cardiovascular: Negative for chest pain, palpitations, orthopnea and leg swelling.   Gastrointestinal: Negative for heartburn, nausea, vomiting and abdominal pain.   Genitourinary: Negative for dysuria, urgency and frequency.   Musculoskeletal: Negative for myalgias, joint pain, and back pain.  Skin: Negative for rash and itching.   Neurological: Negative for dizziness, tingling, tremors, sensory change, focal weakness and headaches.   Endo/Heme/Allergies: Does not bruise/bleed easily.   Psychiatric/Behavioral: Negative for depression, suicidal ideas and memory loss.  The patient is not nervous/anxious and does not have insomnia.    All other systems reviewed and are negative except as in HPI.    Exam:  Blood Pressure 120/80   Pulse (Abnormal) 54   Temperature 36.8 °C (98.3 °F)   Respiration 16   Height 1.727 m (5' 8\")   Weight 71.2 kg (157 lb)   Oxygen Saturation 94%   General:  Well nourished, well developed female in NAD  Head: Grossly normal.  Neck: Supple without JVD or bruit. Thyroid is not enlarged.  Pulmonary: Clear to ausculation. Normal effort. No rales, ronchi, or wheezing.  Cardiovascular: Regular rate and rhythm without murmur.   Abdomen:  Soft, nontender, nondistended.  Extremities: No clubbing, cyanosis, or edema.  Skin: Intact with no obvious rashes or lesions.  Neuro: Grossly intact.  Psych: Alert and oriented x 3.  Mood and affect appropriate.    Medical decision-making and discussion: Vivi is here today for follow-up.  Her chart and lab results were reviewed, her " medications were refilled, and she will follow-up here as needed.          Assessment/Plan:  1. Hyperlipidemia, unspecified hyperlipidemia type  atorvastatin (LIPITOR) 20 MG Tab   2. Hypothyroidism, acquired, autoimmune  levothyroxine (SYNTHROID) 75 MCG Tab   3. Multiple thyroid nodules  levothyroxine (SYNTHROID) 75 MCG Tab       Return if symptoms worsen or fail to improve.    Please note that this dictation was created using voice recognition software. I have made every reasonable attempt to correct obvious errors, but I expect that there are errors of grammar and possibly content that I did not discover before finalizing the note.

## 2020-05-05 ENCOUNTER — OFFICE VISIT (OUTPATIENT)
Dept: ENDOCRINOLOGY | Facility: MEDICAL CENTER | Age: 60
End: 2020-05-05
Payer: COMMERCIAL

## 2020-05-05 DIAGNOSIS — E06.3 HYPOTHYROIDISM, ACQUIRED, AUTOIMMUNE: ICD-10-CM

## 2020-05-05 DIAGNOSIS — E04.2 MULTIPLE THYROID NODULES: ICD-10-CM

## 2020-05-05 PROCEDURE — 99213 OFFICE O/P EST LOW 20 MIN: CPT | Performed by: INTERNAL MEDICINE

## 2020-05-05 RX ORDER — LEVOTHYROXINE SODIUM 0.05 MG/1
50 TABLET ORAL
Qty: 60 TAB | Refills: 3 | Status: SHIPPED | OUTPATIENT
Start: 2020-05-05 | End: 2020-10-27 | Stop reason: SDUPTHER

## 2020-05-05 NOTE — PROGRESS NOTES
Telephone Appointment Visit   As a means of avoiding spread of COVID-19, this visit is being conducted by telephone. This telephone visit was initiated by the patient and they verbally consented.    Time at start of call: 12:56 am    Reason for Call:  Lab Follow-up    Patient Comments / History:   Patient has been feeling generally well.  She is taking levothyroxine 75 mcg 1/2 pill daily.  We discussed her thyroid results which implies she is perhaps mildly hypothyroid and would benefit from an increase in her dose.  I have suggested we go up to 50 mcg/day and recheck in 2 months.  She is in agreement with that approach.         As regards her nodules she would like to continue to follow with ultrasound.  Thyroid gland is asymptomatic. Patient is not aware of any change in her gland. No discomfort. No difficulty swallowing, breathing, or voice change.  I will see her again in the fall around October and at that time update her ultrasound.    Labs / Images Reviewed    February TSH= 4.5 and free T4= 0.7    Assessment and Plan:     1. Hypothyroidism, acquired, autoimmune             As above.  Trial of levothyroxine 50 mcg daily for 2 months and update lab and discuss.  2. Multiple thyroid nodules              Asymptomatic.  Return office visit and thyroid ultrasound in October    Follow-up: Return visit October 2020    Time at end of call: 1:03 pm    Total Time Spent: 5-10 minutes    Fede Coello M.D.

## 2020-06-12 DIAGNOSIS — M15.9 PRIMARY OSTEOARTHRITIS INVOLVING MULTIPLE JOINTS: ICD-10-CM

## 2020-06-15 RX ORDER — CELECOXIB 200 MG/1
200 CAPSULE ORAL
Qty: 90 CAP | Refills: 0 | Status: SHIPPED | OUTPATIENT
Start: 2020-06-15 | End: 2020-09-09 | Stop reason: SDUPTHER

## 2020-09-09 DIAGNOSIS — M15.9 PRIMARY OSTEOARTHRITIS INVOLVING MULTIPLE JOINTS: ICD-10-CM

## 2020-09-09 RX ORDER — CELECOXIB 200 MG/1
200 CAPSULE ORAL
Qty: 90 CAP | Refills: 0 | Status: SHIPPED | OUTPATIENT
Start: 2020-09-09 | End: 2021-07-14 | Stop reason: SDUPTHER

## 2020-10-22 ENCOUNTER — HOSPITAL ENCOUNTER (OUTPATIENT)
Dept: LAB | Facility: MEDICAL CENTER | Age: 60
End: 2020-10-22
Attending: INTERNAL MEDICINE
Payer: COMMERCIAL

## 2020-10-22 DIAGNOSIS — E06.3 HYPOTHYROIDISM, ACQUIRED, AUTOIMMUNE: ICD-10-CM

## 2020-10-22 LAB
T4 FREE SERPL-MCNC: 1.19 NG/DL (ref 0.93–1.7)
TSH SERPL DL<=0.005 MIU/L-ACNC: 1.99 UIU/ML (ref 0.38–5.33)

## 2020-10-22 PROCEDURE — 36415 COLL VENOUS BLD VENIPUNCTURE: CPT

## 2020-10-22 PROCEDURE — 84443 ASSAY THYROID STIM HORMONE: CPT

## 2020-10-22 PROCEDURE — 84439 ASSAY OF FREE THYROXINE: CPT

## 2020-10-27 ENCOUNTER — OFFICE VISIT (OUTPATIENT)
Dept: ENDOCRINOLOGY | Facility: MEDICAL CENTER | Age: 60
End: 2020-10-27
Attending: INTERNAL MEDICINE
Payer: COMMERCIAL

## 2020-10-27 VITALS
DIASTOLIC BLOOD PRESSURE: 68 MMHG | HEIGHT: 68 IN | WEIGHT: 146 LBS | OXYGEN SATURATION: 99 % | SYSTOLIC BLOOD PRESSURE: 118 MMHG | BODY MASS INDEX: 22.13 KG/M2 | HEART RATE: 80 BPM

## 2020-10-27 DIAGNOSIS — E06.3 HYPOTHYROIDISM, ACQUIRED, AUTOIMMUNE: ICD-10-CM

## 2020-10-27 DIAGNOSIS — E04.2 MULTIPLE THYROID NODULES: ICD-10-CM

## 2020-10-27 DIAGNOSIS — M85.89 OSTEOPENIA OF MULTIPLE SITES: ICD-10-CM

## 2020-10-27 PROCEDURE — 99211 OFF/OP EST MAY X REQ PHY/QHP: CPT | Performed by: INTERNAL MEDICINE

## 2020-10-27 PROCEDURE — 99213 OFFICE O/P EST LOW 20 MIN: CPT | Performed by: INTERNAL MEDICINE

## 2020-10-27 RX ORDER — LEVOTHYROXINE SODIUM 0.05 MG/1
50 TABLET ORAL
Qty: 60 TAB | Refills: 3 | Status: SHIPPED | OUTPATIENT
Start: 2020-10-27 | End: 2021-02-11 | Stop reason: SDUPTHER

## 2020-10-27 ASSESSMENT — FIBROSIS 4 INDEX: FIB4 SCORE: 1.37

## 2020-10-27 NOTE — PROGRESS NOTES
"Chief Complaint   Patient presents with   • Hypothyroidism     Autoimmune thyroiditis   • Follow-Up     Thyroid nodules   • Osteopenia        HPI:    The patient is feeling very well.  No new developments in her health history.  She is not aware of any changes in her neck around her thyroid area.  She is limiting her activities and exposure because of COVID-19.  No more [employer] classes because of back problems.    See assessment and recommendations below    ROS:  All other systems reported as negative or unchanged since last exam      Allergies: No Known Allergies    Current medicines including changes today:  Current Outpatient Medications   Medication Sig Dispense Refill   • celecoxib (CELEBREX) 200 MG Cap Take 1 Cap by mouth every day. 90 Cap 0   • levothyroxine (SYNTHROID) 50 MCG Tab Take 1 Tab by mouth Every morning on an empty stomach. 60 Tab 3   • atorvastatin (LIPITOR) 20 MG Tab Take 1 Tab by mouth every day. 90 Tab 3   • levothyroxine (SYNTHROID) 75 MCG Tab Take one half tablet by mouth each morning on an empty stomach 90 Tab 3   • clindamycin (CLEOCIN) 150 MG Cap Take 1 Cap by mouth 3 times a day. 21 Cap 0   • Nutritional Supplements (ESTROVEN PO) Take  by mouth.       No current facility-administered medications for this visit.         Past Medical History:   Diagnosis Date   • Chronic autoimmune thyroiditis      + US / + TPO =293   • Hyperlipidemia 10/21/2014   • Hypothyroidism    • Multiple thyroid nodules        PHYSICAL EXAM:    /68 (BP Location: Left arm, Patient Position: Sitting, BP Cuff Size: Adult)   Pulse 80   Ht 1.727 m (5' 8\")   Wt 66.2 kg (146 lb)   SpO2 99%   BMI 22.20 kg/m²     Gen.   appears healthy, good posture    Skin   appropriate for sex and age    HEENT  unremarkable    Neck       thyroid gland is very difficult to palpate.  I cannot feel distinct nodules.    Heart  regular    Extremities  no edema    Neuro  gait and station normal, no tremor    Psych  appropriate, calm, " pleasant      ASSESSMENT AND RECOMMENDATIONS    1. Hypothyroidism, acquired, autoimmune               Clinically euthyroid taking levothyroxine 50 mcg/day.               Current TSH is 1.9 and free thyroxine 1.1.  No dose change indicated.    2. Multiple thyroid nodules             Thyroid gland is asymptomatic. Patient is not aware of any change in her gland. No discomfort. No difficulty swallowing, breathing, or voice change.             Update thyroid ultrasound and review by my chart or telephone    3. Osteopenia of multiple sites              Last bone density was 2014.  Lumbar spine T score -1.5 and hip T score -2.1.              She does take a vitamin D supplement.  She also exercises regularly.  Also taking a supplement called Estroven and I cannot find out the ingredients from my current sources              I will update her bone density and further discuss       DISPOSITION: Follow-up test results via MyChart or telephone                             She is currently arranging a new PCP    Fede Coello M.D.    Copies to: Pcp Pt none

## 2021-02-11 ENCOUNTER — OFFICE VISIT (OUTPATIENT)
Dept: MEDICAL GROUP | Facility: PHYSICIAN GROUP | Age: 61
End: 2021-02-11
Payer: COMMERCIAL

## 2021-02-11 VITALS
HEIGHT: 68 IN | WEIGHT: 150 LBS | SYSTOLIC BLOOD PRESSURE: 112 MMHG | OXYGEN SATURATION: 96 % | TEMPERATURE: 98.2 F | RESPIRATION RATE: 16 BRPM | HEART RATE: 56 BPM | BODY MASS INDEX: 22.73 KG/M2 | DIASTOLIC BLOOD PRESSURE: 68 MMHG

## 2021-02-11 DIAGNOSIS — Z11.59 NEED FOR HEPATITIS C SCREENING TEST: ICD-10-CM

## 2021-02-11 DIAGNOSIS — E04.2 MULTIPLE THYROID NODULES: ICD-10-CM

## 2021-02-11 DIAGNOSIS — R51.9 NONINTRACTABLE HEADACHE, UNSPECIFIED CHRONICITY PATTERN, UNSPECIFIED HEADACHE TYPE: ICD-10-CM

## 2021-02-11 DIAGNOSIS — E06.3 HYPOTHYROIDISM, ACQUIRED, AUTOIMMUNE: ICD-10-CM

## 2021-02-11 DIAGNOSIS — H54.60 MONOCULAR VISION LOSS: ICD-10-CM

## 2021-02-11 DIAGNOSIS — Z79.899 HIGH RISK MEDICATION USE: ICD-10-CM

## 2021-02-11 DIAGNOSIS — Z13.6 SCREENING FOR CARDIOVASCULAR CONDITION: ICD-10-CM

## 2021-02-11 DIAGNOSIS — E78.5 HYPERLIPIDEMIA, UNSPECIFIED HYPERLIPIDEMIA TYPE: ICD-10-CM

## 2021-02-11 DIAGNOSIS — E55.9 VITAMIN D DEFICIENCY: ICD-10-CM

## 2021-02-11 DIAGNOSIS — Z13.0 SCREENING FOR DEFICIENCY ANEMIA: ICD-10-CM

## 2021-02-11 DIAGNOSIS — M19.90 ARTHRITIS: ICD-10-CM

## 2021-02-11 DIAGNOSIS — Z12.39 ENCOUNTER FOR BREAST CANCER SCREENING USING NON-MAMMOGRAM MODALITY: ICD-10-CM

## 2021-02-11 DIAGNOSIS — E78.2 MIXED HYPERLIPIDEMIA: ICD-10-CM

## 2021-02-11 DIAGNOSIS — H54.7 VISION LOSS: ICD-10-CM

## 2021-02-11 PROCEDURE — 99214 OFFICE O/P EST MOD 30 MIN: CPT | Performed by: PHYSICIAN ASSISTANT

## 2021-02-11 RX ORDER — ATORVASTATIN CALCIUM 20 MG/1
20 TABLET, FILM COATED ORAL DAILY
Qty: 90 TABLET | Refills: 3 | Status: SHIPPED | OUTPATIENT
Start: 2021-02-11 | End: 2022-02-17

## 2021-02-11 RX ORDER — LEVOTHYROXINE SODIUM 0.05 MG/1
50 TABLET ORAL
Qty: 60 TABLET | Refills: 3 | Status: SHIPPED | OUTPATIENT
Start: 2021-02-11 | End: 2021-11-06

## 2021-02-11 ASSESSMENT — FIBROSIS 4 INDEX: FIB4 SCORE: 1.37

## 2021-02-11 ASSESSMENT — PATIENT HEALTH QUESTIONNAIRE - PHQ9: CLINICAL INTERPRETATION OF PHQ2 SCORE: 0

## 2021-02-11 NOTE — ASSESSMENT & PLAN NOTE
IMPRESSION:     1.  There is a stable possible left mid lateral thyroid gland hypoechoic nodule. It is uncertain if this is an exophytic thyroid gland nodule versus extrathyroid nodule.     ACR TI-RADS Recommendations  TR4 - follow up ultrasound in 1,2,3 and 5 years. This has been stable for 3 years.     Recommendations based on the American College of Radiology Thyroid imaging, reporting and Data System (TI-RADS) 2017.

## 2021-02-11 NOTE — ASSESSMENT & PLAN NOTE
This is a  chronic condition.   Supplementation: 2000 IU daily  Last Vitamin D level:   VIT D:   Lab Results   Component Value Date/Time    25HYDROXY 30 02/21/2020 0954     Patient  denies any muscle aches or fatigue.

## 2021-02-11 NOTE — ASSESSMENT & PLAN NOTE
Patient states that a month ago, she had vision loss left eye, lasted about 1 hour then resolved. She was in [employer]- to see eye doctor and they told her to go to ER, she did not. Had headache at the time as well. Took 2 excedrin and it resolved. Had this happen frequently years past, but hadn't had an event in 4 years. Asymptomatic now.   She went to retina specialist and said everything was normal.     Denies any chest pain, shortness of breath or palpitations at the event or currently.  Denies any eye pain.  Denies any temporal headaches or pain with chewing.

## 2021-02-11 NOTE — LETTER
[facility]  Belen Trinidad P.A.-C.  [address]  [place] [address]  Fax: [phone]   Authorization for Release/Disclosure of   Protected Health Information   Name: VIVI WAGONER : 1960 SSN: [id number]   Address: [address] [place] [address]  [place] [address] Phone:    [phone] (home)    I authorize the entity listed below to release/disclose the PHI below to:   [facility]/Belen Trinidad P.A.-C. and Belen Trinidad P.A[facility]   Provider or Entity Name:  Dr. Valles   Address   City, State, [facility]   Phone:      Fax:     Reason for request: continuity of care   Information to be released:    [  ] LAST COLONOSCOPY,  including any PATH REPORT and follow-up  [  ] LAST FIT/COLOGUARD RESULT [  ] LAST DEXA  [  ] LAST MAMMOGRAM  [ X ] LAST PAP  [  ] LAST LABS [  ] RETINA EXAM REPORT  [  ] IMMUNIZATION RECORDS  [  ] Release all info      [  ] Check here and initial the line next to each item to release ALL health information INCLUDING  _____ Care and treatment for drug and / or alcohol abuse  _____ HIV testing, infection status, or AIDS  _____ Genetic Testing    DATES OF SERVICE OR TIME PERIOD TO BE DISCLOSED: _____________  I understand and acknowledge that:  * This Authorization may be revoked at any time by you in writing, except if your health information has already been used or disclosed.  * Your health information that will be used or disclosed as a result of you signing this authorization could be re-disclosed by the recipient. If this occurs, your re-disclosed health information may no longer be protected by State or Federal laws.  * You may refuse to sign this Authorization. Your refusal will not affect your ability to obtain treatment.  * This Authorization becomes effective upon signing and will  on (date) __________.      If no date is indicated, this Authorization will  one (1) year from the signature date.    Name: Vivi Wagoner    Signature:   Date:     2021       PLEASE FAX  REQUESTED RECORDS BACK TO: [phone]

## 2021-02-11 NOTE — ASSESSMENT & PLAN NOTE
This is a chronic condition.   Latest Labs:   Lab Results   Component Value Date/Time    CHOLSTRLTOT 174 02/21/2020 09:54 AM    LDL 95 02/21/2020 09:54 AM    HDL 58 02/21/2020 09:54 AM    TRIGLYCERIDE 107 02/21/2020 09:54 AM      Medications: atorvastatin 20 mg  Medication side effects: none  Family History of high cholesterol or heart disease? Yes- mom heart disease  Risk calculator: The 10-year ASCVD risk score (Jean Claude MOHR Jr., et al., 2013) is: 2.8%

## 2021-02-11 NOTE — ASSESSMENT & PLAN NOTE
Takes Celebrex 200 mg every other day, hand arthritis. Tolerates without side effect. Has been on for years.

## 2021-02-11 NOTE — PROGRESS NOTES
CC:   Chief Complaint   Patient presents with   • Establish Care   • Loss of Vision     1 month ago          HISTORY OF PRESENT ILLNESS: Patient is a 60 y.o. female established patient who presents today to establish care with me and discuss the following issues:      Health Maintenance: Completed  Elects to have ultrasound of breast versus mammogram. Will order US for her.   Pap smear - has gynecologist - Dr. Valles- will request records.   Hepatitis C - will screen    Hyperlipidemia  This is a chronic condition.   Latest Labs:   Lab Results   Component Value Date/Time    CHOLSTRLTOT 174 02/21/2020 09:54 AM    LDL 95 02/21/2020 09:54 AM    HDL 58 02/21/2020 09:54 AM    TRIGLYCERIDE 107 02/21/2020 09:54 AM      Medications: atorvastatin 20 mg  Medication side effects: none  Family History of high cholesterol or heart disease? Yes- mom heart disease  Risk calculator: The 10-year ASCVD risk score ([place] MOHR Jr., et al., 2013) is: 2.8%       Arthritis  Takes Celebrex 200 mg every other day, hand arthritis. Tolerates without side effect. Has been on for years.     Hypothyroidism, acquired, autoimmune  This is a  chronic condition.   Onset: years ago  Last TSH level:   TSH   Date Value Ref Range Status   10/22/2020 1.990 0.380 - 5.330 uIU/mL Final     Comment:     Please note new reference ranges effective 12/14/2017 10:00 AM  Pregnant Females, 1st Trimester  0.050-3.700  Pregnant Females, 2nd Trimester  0.310-4.350  Pregnant Females, 3rd Trimester  0.410-5.180       Current Medication: levothyroxine 50 mcg daily  Side effects to medication: none  Denies any chronic fatigue, constipation, dry skin, weight gain, heat/cold intolerance. Complains of hair loss x 2 months.       Vitamin D deficiency  This is a  chronic condition.   Supplementation: 2000 IU daily  Last Vitamin D level:   VIT D:   Lab Results   Component Value Date/Time    25HYDROXY 30 02/21/2020 0954     Patient  denies any muscle aches or fatigue.        Multiple thyroid nodules  IMPRESSION:     1.  There is a stable possible left mid lateral thyroid gland hypoechoic nodule. It is uncertain if this is an exophytic thyroid gland nodule versus extrathyroid nodule.     ACR TI-RADS Recommendations  TR4 - follow up ultrasound in 1,2,3 and 5 years. This has been stable for 3 years.     Recommendations based on the [employer] College [employer] Thyroid imaging, reporting and Data System (TI-RADS) 2017.    Vision loss  Patient states that a month ago, she had vision loss left eye, lasted about 1 hour then resolved. She was in [employer]- to see eye doctor and they told her to go to ER, she did not. Had headache at the time as well. Took 2 excedrin and it resolved. Had this happen frequently years past, but hadn't had an event in 4 years. Asymptomatic now.   She went to retina specialist and said everything was normal.     Denies any chest pain, shortness of breath or palpitations at the event or currently.  Denies any eye pain.  Denies any temporal headaches or pain with chewing.      Patient Active Problem List    Diagnosis Date Noted   • Arthritis 02/11/2021   • Vision loss 02/11/2021   • Vitamin D deficiency 12/26/2018   • Left foot pain 12/05/2018   • Renal mass 04/23/2018   • Ganglion cyst 04/23/2018   • Abnormal finding on imaging 04/02/2018   • Hypothyroidism, acquired, autoimmune 03/30/2016   • Multiple thyroid nodules 08/12/2015   • Nasal sinus cyst 08/12/2015   • Bilateral shoulder pain 08/12/2015   • Fatigue 07/24/2015   • Family history of heart disease in female family member before age 65 07/24/2015   • Hyperlipidemia 10/21/2014   • S/P laminectomy 10/21/2014   • Osteopenia 10/21/2014      Allergies:Patient has no known allergies.    Current Outpatient Medications   Medication Sig Dispense Refill   • atorvastatin (LIPITOR) 20 MG Tab Take 1 tablet by mouth every day. 90 tablet 3   • levothyroxine (SYNTHROID) 50 MCG Tab Take 1 tablet by mouth Every morning on  "an empty stomach. 60 tablet 3   • celecoxib (CELEBREX) 200 MG Cap Take 1 Cap by mouth every day. 90 Cap 0   • Nutritional Supplements (ESTROVEN PO) Take  by mouth.       No current facility-administered medications for this visit.       Social History     Tobacco Use   • Smoking status: Never Smoker   • Smokeless tobacco: Never Used   Substance Use Topics   • Alcohol use: No     Alcohol/week: 0.0 oz   • Drug use: No     Social History     Social History Narrative   • Not on file       Family History   Problem Relation Age of Onset   • Heart Disease Mother    • Other Father         cirrhosis   • Diabetes Neg Hx    • Stroke Neg Hx        Review of Systems:    Constitutional: No Fevers, Chills  Eyes: Vision loss 1 month ago, see HPI.   ENT: No hearing changes  Resp: No Shortness of breath  CV: No Chest pain  GI: No Nausea/Vomiting  MSK: No weakness  Skin: No rashes  Neuro: No Headaches  Psych: No Suicidal ideations    All remaining systems reviewed and found to be negative, except as stated above.    Exam:    /68 (BP Location: Right arm, Patient Position: Sitting, BP Cuff Size: Adult)   Pulse (!) 56   Temp 36.8 °C (98.2 °F) (Temporal)   Resp 16   Ht 1.727 m (5' 8\")   Wt 68 kg (150 lb)   SpO2 96%  Body mass index is 22.81 kg/m².    General:  Well nourished, well developed female in NAD  HENT: Atraumatic, normocephalic  EYES: Extraocular movements intact  NECK: Supple, FROM  CHEST: No deformities, Equal chest expansion  RESP: Unlabored, no stridor or audible wheeze  HEART: Regular Rate and rhythm.   ABD: Soft, Nontender, Non-Distended  Extremities: No Clubbing, Cyanosis, or Edema  Skin: Warm/dry, without rashes  Neuro: A/O x 4, due to COVID-19- did not have patient remove face mask to test cranial nerves.  Motor/sensory grossly intact  Psych: Normal behavior, normal affect      Lab review:  Labs are reviewed and discussed with a patient  Lab Results   Component Value Date/Time    TRELL 174 02/21/2020 " 09:54 AM    LDL 95 02/21/2020 09:54 AM    HDL 58 02/21/2020 09:54 AM    TRIGLYCERIDE 107 02/21/2020 09:54 AM       Lab Results   Component Value Date/Time    SODIUM 138 02/21/2020 09:54 AM    POTASSIUM 4.2 02/21/2020 09:54 AM    CHLORIDE 105 02/21/2020 09:54 AM    CO2 29 02/21/2020 09:54 AM    GLUCOSE 95 02/21/2020 09:54 AM    BUN 17 02/21/2020 09:54 AM    CREATININE 0.93 02/21/2020 09:54 AM     Lab Results   Component Value Date/Time    ALKPHOSPHAT 59 02/21/2020 09:54 AM    ASTSGOT 21 02/21/2020 09:54 AM    ALTSGPT 23 02/21/2020 09:54 AM    TBILIRUBIN 1.0 02/21/2020 09:54 AM      No results found for: HBA1C  No results found for: TSH  Lab Results   Component Value Date/Time    FREET4 1.19 10/22/2020 09:08 AM    FREET4 0.74 02/21/2020 09:54 AM       Lab Results   Component Value Date/Time    WBC 4.0 (L) 02/21/2020 09:54 AM    RBC 4.54 02/21/2020 09:54 AM    HEMOGLOBIN 13.1 02/21/2020 09:54 AM    HEMATOCRIT 39.5 02/21/2020 09:54 AM    MCV 87.0 02/21/2020 09:54 AM    MCH 28.9 02/21/2020 09:54 AM    MCHC 33.2 (L) 02/21/2020 09:54 AM    MPV 10.1 02/21/2020 09:54 AM    NEUTSPOLYS 50.80 12/06/2018 10:01 AM    LYMPHOCYTES 40.30 12/06/2018 10:01 AM    MONOCYTES 6.80 12/06/2018 10:01 AM    EOSINOPHILS 0.80 12/06/2018 10:01 AM    BASOPHILS 1.10 12/06/2018 10:01 AM          Assessment/Plan:  1. Monocular vision loss  - US-CAROTID DOPPLER BILAT; Future  - Sed Rate; Future  - CRP QUANTITIVE (NON-CARDIAC); Future  2. Nonintractable headache, unspecified chronicity pattern, unspecified headache type  - CT-HEAD W/O; Future  Concerned about the patient's monocular vision loss of the left eye 1 month ago that lasted approximately an hour in addition to headache.  Resolved after taking 2 Excedrin.  Patient was in [employer] at the time was told to go to the ED by the eye care center there but she did not.  Patient states she has had a history of this prior to this event but no recurrences in the last 4 years up until a month ago.   Differential diagnosis would include carotid artery stenosis, atherosclerosis, temporal arteritis on the differential, will add a CRP and sed rate to the blood work.  Patient denied a temporal headache or any pain with chewing.  Patient denied any chest pain, palpitations or shortness of breath during the event.  Heart is a regular rate and rhythm on exam today.  Patient states that she did see a retina specialist last week stated that her eyes were normal and her symptoms were unrelated to her eyes.  Trying to acquire records with patient cannot tell me who she went to.  Getting baseline labs.  Also getting a CT of the head.  Emphasized to the patient that if the symptoms do recur that this is an emergent work-up and she needs to proceed directly to the ER versus an outpatient work-up.  Patient understands and agrees to plan.  Getting a CT of the head first but would recommend prophylactic baby aspirin following results.  3. Mixed hyperlipidemia  - Lipid Profile; Future  Due for updated labs, patient is on atorvastatin 20 mg daily and tolerates without side effect.  4. Arthritis  Patient taking Celebrex every other day.  Tolerates without side effect.  Getting updated kidney function test.  5. Hypothyroidism, acquired, autoimmune  - TSH; Future  - T3 FREE; Future  - FREE THYROXINE; Future  - levothyroxine (SYNTHROID) 50 MCG Tab; Take 1 tablet by mouth Every morning on an empty stomach.  Dispense: 60 tablet; Refill: 3  Continue levothyroxine 50 mcg daily, due for updated labs.  Patient complains of hair loss the last 2 months.  6. Vitamin D deficiency  - VITAMIN D,25 HYDROXY; Future    7. Multiple thyroid nodules  Per last thyroid ultrasound, stable thyroid nodules.  8. Need for hepatitis C screening test  - HCV Scrn ( [id number] 1xLife); Future    9. Hyperlipidemia, unspecified hyperlipidemia type  - atorvastatin (LIPITOR) 20 MG Tab; Take 1 tablet by mouth every day.  Dispense: 90 tablet; Refill: 3    10.  Screening for deficiency anemia  - CBC WITH DIFFERENTIAL; Future    11. Screening for cardiovascular condition  - Comp Metabolic Panel; Future    12. High risk medication use  - Comp Metabolic Panel; Future    13. Encounter for breast cancer screening using non-mammogram modality  - US-SCREENING WHOLE BREAST BILATERAL (3D SCREENING); Future  Patient declines getting a mammogram and request an ultrasound of the breast.  I see no previous records but she states that she had them done in [place], may be done in [place] as well?  14. Vision loss  - US-CAROTID DOPPLER BILAT; Future       Follow-up: Return in about 4 weeks (around 3/11/2021) for Follow up on labs and imaging.    Please note that this dictation was created using voice recognition software. I have made every reasonable attempt to correct obvious errors, but I expect that there are errors of grammar and possibly content that I did not discover before finalizing the note.

## 2021-02-11 NOTE — ASSESSMENT & PLAN NOTE
This is a  chronic condition.   Onset: years ago  Last TSH level:   TSH   Date Value Ref Range Status   10/22/2020 1.990 0.380 - 5.330 uIU/mL Final     Comment:     Please note new reference ranges effective 12/14/2017 10:00 AM  Pregnant Females, 1st Trimester  0.050-3.700  Pregnant Females, 2nd Trimester  0.310-4.350  Pregnant Females, 3rd Trimester  0.410-5.180       Current Medication: levothyroxine 50 mcg daily  Side effects to medication: none  Denies any chronic fatigue, constipation, dry skin, weight gain, heat/cold intolerance. Complains of hair loss x 2 months.

## 2021-02-12 ENCOUNTER — HOSPITAL ENCOUNTER (OUTPATIENT)
Dept: LAB | Facility: MEDICAL CENTER | Age: 61
End: 2021-02-12
Attending: PHYSICIAN ASSISTANT
Payer: COMMERCIAL

## 2021-02-12 DIAGNOSIS — E06.3 HYPOTHYROIDISM, ACQUIRED, AUTOIMMUNE: ICD-10-CM

## 2021-02-12 DIAGNOSIS — E78.2 MIXED HYPERLIPIDEMIA: ICD-10-CM

## 2021-02-12 DIAGNOSIS — Z13.6 SCREENING FOR CARDIOVASCULAR CONDITION: ICD-10-CM

## 2021-02-12 DIAGNOSIS — Z11.59 NEED FOR HEPATITIS C SCREENING TEST: ICD-10-CM

## 2021-02-12 DIAGNOSIS — Z79.899 HIGH RISK MEDICATION USE: ICD-10-CM

## 2021-02-12 DIAGNOSIS — Z13.0 SCREENING FOR DEFICIENCY ANEMIA: ICD-10-CM

## 2021-02-12 DIAGNOSIS — E55.9 VITAMIN D DEFICIENCY: ICD-10-CM

## 2021-02-12 DIAGNOSIS — H54.60 MONOCULAR VISION LOSS: ICD-10-CM

## 2021-02-12 LAB
25(OH)D3 SERPL-MCNC: 29 NG/ML (ref 30–100)
ALBUMIN SERPL BCP-MCNC: 4.5 G/DL (ref 3.2–4.9)
ALBUMIN/GLOB SERPL: 1.6 G/DL
ALP SERPL-CCNC: 79 U/L (ref 30–99)
ALT SERPL-CCNC: 26 U/L (ref 2–50)
ANION GAP SERPL CALC-SCNC: 9 MMOL/L (ref 7–16)
AST SERPL-CCNC: 21 U/L (ref 12–45)
BASOPHILS # BLD AUTO: 1.2 % (ref 0–1.8)
BASOPHILS # BLD: 0.05 K/UL (ref 0–0.12)
BILIRUB SERPL-MCNC: 0.8 MG/DL (ref 0.1–1.5)
BUN SERPL-MCNC: 16 MG/DL (ref 8–22)
CALCIUM SERPL-MCNC: 9.6 MG/DL (ref 8.5–10.5)
CHLORIDE SERPL-SCNC: 105 MMOL/L (ref 96–112)
CHOLEST SERPL-MCNC: 185 MG/DL (ref 100–199)
CO2 SERPL-SCNC: 28 MMOL/L (ref 20–33)
CREAT SERPL-MCNC: 0.8 MG/DL (ref 0.5–1.4)
CRP SERPL HS-MCNC: 0.07 MG/DL (ref 0–0.75)
EOSINOPHIL # BLD AUTO: 0.07 K/UL (ref 0–0.51)
EOSINOPHIL NFR BLD: 1.7 % (ref 0–6.9)
ERYTHROCYTE [DISTWIDTH] IN BLOOD BY AUTOMATED COUNT: 42.6 FL (ref 35.9–50)
GLOBULIN SER CALC-MCNC: 2.8 G/DL (ref 1.9–3.5)
GLUCOSE SERPL-MCNC: 88 MG/DL (ref 65–99)
HCT VFR BLD AUTO: 41.1 % (ref 37–47)
HCV AB SER QL: NORMAL
HDLC SERPL-MCNC: 64 MG/DL
HGB BLD-MCNC: 13.4 G/DL (ref 12–16)
IMM GRANULOCYTES # BLD AUTO: 0.01 K/UL (ref 0–0.11)
IMM GRANULOCYTES NFR BLD AUTO: 0.2 % (ref 0–0.9)
LDLC SERPL CALC-MCNC: 102 MG/DL
LYMPHOCYTES # BLD AUTO: 1.6 K/UL (ref 1–4.8)
LYMPHOCYTES NFR BLD: 38.1 % (ref 22–41)
MCH RBC QN AUTO: 28.9 PG (ref 27–33)
MCHC RBC AUTO-ENTMCNC: 32.6 G/DL (ref 33.6–35)
MCV RBC AUTO: 88.8 FL (ref 81.4–97.8)
MONOCYTES # BLD AUTO: 0.27 K/UL (ref 0–0.85)
MONOCYTES NFR BLD AUTO: 6.4 % (ref 0–13.4)
NEUTROPHILS # BLD AUTO: 2.2 K/UL (ref 2–7.15)
NEUTROPHILS NFR BLD: 52.4 % (ref 44–72)
NRBC # BLD AUTO: 0 K/UL
NRBC BLD-RTO: 0 /100 WBC
PLATELET # BLD AUTO: 195 K/UL (ref 164–446)
PMV BLD AUTO: 10.8 FL (ref 9–12.9)
POTASSIUM SERPL-SCNC: 4.3 MMOL/L (ref 3.6–5.5)
PROT SERPL-MCNC: 7.3 G/DL (ref 6–8.2)
RBC # BLD AUTO: 4.63 M/UL (ref 4.2–5.4)
SODIUM SERPL-SCNC: 142 MMOL/L (ref 135–145)
T3FREE SERPL-MCNC: 2.65 PG/ML (ref 2–4.4)
T4 FREE SERPL-MCNC: 1.02 NG/DL (ref 0.93–1.7)
TRIGL SERPL-MCNC: 94 MG/DL (ref 0–149)
TSH SERPL DL<=0.005 MIU/L-ACNC: 2.84 UIU/ML (ref 0.38–5.33)
WBC # BLD AUTO: 4.2 K/UL (ref 4.8–10.8)

## 2021-02-12 PROCEDURE — 85025 COMPLETE CBC W/AUTO DIFF WBC: CPT

## 2021-02-12 PROCEDURE — 80053 COMPREHEN METABOLIC PANEL: CPT

## 2021-02-12 PROCEDURE — 84439 ASSAY OF FREE THYROXINE: CPT

## 2021-02-12 PROCEDURE — G0472 HEP C SCREEN HIGH RISK/OTHER: HCPCS

## 2021-02-12 PROCEDURE — 84481 FREE ASSAY (FT-3): CPT

## 2021-02-12 PROCEDURE — 80061 LIPID PANEL: CPT

## 2021-02-12 PROCEDURE — 36415 COLL VENOUS BLD VENIPUNCTURE: CPT

## 2021-02-12 PROCEDURE — 85652 RBC SED RATE AUTOMATED: CPT

## 2021-02-12 PROCEDURE — 82306 VITAMIN D 25 HYDROXY: CPT

## 2021-02-12 PROCEDURE — 86140 C-REACTIVE PROTEIN: CPT

## 2021-02-12 PROCEDURE — 84443 ASSAY THYROID STIM HORMONE: CPT

## 2021-02-13 LAB — ERYTHROCYTE [SEDIMENTATION RATE] IN BLOOD BY WESTERGREN METHOD: 11 MM/HOUR (ref 0–30)

## 2021-02-16 ENCOUNTER — TELEPHONE (OUTPATIENT)
Dept: MEDICAL GROUP | Facility: PHYSICIAN GROUP | Age: 61
End: 2021-02-16

## 2021-02-16 NOTE — TELEPHONE ENCOUNTER
----- Message from Belen Trinidad P.A.-C. sent at 2/15/2021  6:16 AM PST -----  Please call patient about their results.     Results showed: cholesterol just slightly elevated. I recommend decreasing your intake of saturated fats which are found in meats that come from a cow or pig. Saturated fats are also found in creams, cheeses, butter, mayonnaise, and fried foods. I recommend that you try to eat more vegetables, fruits, fish, and healthy oils like olive oil. Increase fiber intake to 35 grams or more a day. If you do not eat a lot of fiber currently, increase fiber slowly over weeks to reduce bloating and abdominal discomfort. I also recommend moderate intensity exercise like a brisk walk. This exercise should last at least 30 minutes and occur 5 or more days per week.     Your WBC is slightly low, but very stable compared to previous labs and the rest of your CBC looks normal which is re-assuring.     Vitamin D is low.  Please start supplementing vitamin D 2000 IUs daily.    Thyroid labs are normal.     Other labs look good. We will discuss your labs in full detail at our next follow up.     Thank you,    Belen Trinidad PA-C

## 2021-02-19 ENCOUNTER — HOSPITAL ENCOUNTER (OUTPATIENT)
Dept: RADIOLOGY | Facility: MEDICAL CENTER | Age: 61
End: 2021-02-19
Attending: PHYSICIAN ASSISTANT
Payer: COMMERCIAL

## 2021-02-19 DIAGNOSIS — H54.60 MONOCULAR VISION LOSS: ICD-10-CM

## 2021-02-19 DIAGNOSIS — H54.7 VISION LOSS: ICD-10-CM

## 2021-02-19 PROCEDURE — 93880 EXTRACRANIAL BILAT STUDY: CPT

## 2021-02-22 ENCOUNTER — TELEPHONE (OUTPATIENT)
Dept: MEDICAL GROUP | Facility: PHYSICIAN GROUP | Age: 61
End: 2021-02-22

## 2021-02-22 NOTE — TELEPHONE ENCOUNTER
----- Message from Belen Trinidad P.A.-C. sent at 2/22/2021 10:29 AM PST -----  Please call patient about their results.     Results showed: Normal carotid ultrasound.    Thank you,    Belen Trinidad PA-C

## 2021-07-14 ENCOUNTER — HOSPITAL ENCOUNTER (OUTPATIENT)
Dept: LAB | Facility: MEDICAL CENTER | Age: 61
End: 2021-07-14
Attending: PHYSICIAN ASSISTANT
Payer: COMMERCIAL

## 2021-07-14 ENCOUNTER — OFFICE VISIT (OUTPATIENT)
Dept: MEDICAL GROUP | Facility: PHYSICIAN GROUP | Age: 61
End: 2021-07-14
Payer: COMMERCIAL

## 2021-07-14 VITALS
DIASTOLIC BLOOD PRESSURE: 70 MMHG | BODY MASS INDEX: 22.43 KG/M2 | HEIGHT: 68 IN | HEART RATE: 57 BPM | TEMPERATURE: 97 F | SYSTOLIC BLOOD PRESSURE: 100 MMHG | WEIGHT: 148 LBS | OXYGEN SATURATION: 96 % | RESPIRATION RATE: 12 BRPM

## 2021-07-14 DIAGNOSIS — R53.83 FATIGUE, UNSPECIFIED TYPE: ICD-10-CM

## 2021-07-14 DIAGNOSIS — H54.7 VISION LOSS: ICD-10-CM

## 2021-07-14 DIAGNOSIS — E06.3 HYPOTHYROIDISM, ACQUIRED, AUTOIMMUNE: ICD-10-CM

## 2021-07-14 DIAGNOSIS — R79.89 ABNORMAL CBC: ICD-10-CM

## 2021-07-14 DIAGNOSIS — E78.2 MIXED HYPERLIPIDEMIA: ICD-10-CM

## 2021-07-14 DIAGNOSIS — M15.9 PRIMARY OSTEOARTHRITIS INVOLVING MULTIPLE JOINTS: ICD-10-CM

## 2021-07-14 DIAGNOSIS — M19.90 ARTHRITIS: ICD-10-CM

## 2021-07-14 DIAGNOSIS — Z12.39 ENCOUNTER FOR SCREENING FOR MALIGNANT NEOPLASM OF BREAST, UNSPECIFIED SCREENING MODALITY: ICD-10-CM

## 2021-07-14 LAB
APPEARANCE UR: CLEAR
BASOPHILS # BLD AUTO: 0.8 % (ref 0–1.8)
BASOPHILS # BLD: 0.05 K/UL (ref 0–0.12)
BILIRUB UR QL STRIP.AUTO: NEGATIVE
COLOR UR: YELLOW
EOSINOPHIL # BLD AUTO: 0.04 K/UL (ref 0–0.51)
EOSINOPHIL NFR BLD: 0.7 % (ref 0–6.9)
ERYTHROCYTE [DISTWIDTH] IN BLOOD BY AUTOMATED COUNT: 38.5 FL (ref 35.9–50)
GLUCOSE UR STRIP.AUTO-MCNC: NEGATIVE MG/DL
HCT VFR BLD AUTO: 41.5 % (ref 37–47)
HGB BLD-MCNC: 13.6 G/DL (ref 12–16)
IMM GRANULOCYTES # BLD AUTO: 0.02 K/UL (ref 0–0.11)
IMM GRANULOCYTES NFR BLD AUTO: 0.3 % (ref 0–0.9)
KETONES UR STRIP.AUTO-MCNC: NEGATIVE MG/DL
LEUKOCYTE ESTERASE UR QL STRIP.AUTO: NEGATIVE
LYMPHOCYTES # BLD AUTO: 2.21 K/UL (ref 1–4.8)
LYMPHOCYTES NFR BLD: 36.3 % (ref 22–41)
MCH RBC QN AUTO: 28.5 PG (ref 27–33)
MCHC RBC AUTO-ENTMCNC: 32.8 G/DL (ref 33.6–35)
MCV RBC AUTO: 87 FL (ref 81.4–97.8)
MICRO URNS: NORMAL
MONOCYTES # BLD AUTO: 0.37 K/UL (ref 0–0.85)
MONOCYTES NFR BLD AUTO: 6.1 % (ref 0–13.4)
NEUTROPHILS # BLD AUTO: 3.39 K/UL (ref 2–7.15)
NEUTROPHILS NFR BLD: 55.8 % (ref 44–72)
NITRITE UR QL STRIP.AUTO: NEGATIVE
NRBC # BLD AUTO: 0 K/UL
NRBC BLD-RTO: 0 /100 WBC
PH UR STRIP.AUTO: 8 [PH] (ref 5–8)
PLATELET # BLD AUTO: 254 K/UL (ref 164–446)
PMV BLD AUTO: 10 FL (ref 9–12.9)
PROT UR QL STRIP: NEGATIVE MG/DL
RBC # BLD AUTO: 4.77 M/UL (ref 4.2–5.4)
RBC UR QL AUTO: NEGATIVE
SP GR UR STRIP.AUTO: 1.02
UROBILINOGEN UR STRIP.AUTO-MCNC: 0.2 MG/DL
WBC # BLD AUTO: 6.1 K/UL (ref 4.8–10.8)

## 2021-07-14 PROCEDURE — 83550 IRON BINDING TEST: CPT

## 2021-07-14 PROCEDURE — 99214 OFFICE O/P EST MOD 30 MIN: CPT | Performed by: PHYSICIAN ASSISTANT

## 2021-07-14 PROCEDURE — 80053 COMPREHEN METABOLIC PANEL: CPT

## 2021-07-14 PROCEDURE — 85025 COMPLETE CBC W/AUTO DIFF WBC: CPT

## 2021-07-14 PROCEDURE — 84443 ASSAY THYROID STIM HORMONE: CPT

## 2021-07-14 PROCEDURE — 82746 ASSAY OF FOLIC ACID SERUM: CPT

## 2021-07-14 PROCEDURE — 83540 ASSAY OF IRON: CPT

## 2021-07-14 PROCEDURE — 36415 COLL VENOUS BLD VENIPUNCTURE: CPT

## 2021-07-14 PROCEDURE — 82728 ASSAY OF FERRITIN: CPT

## 2021-07-14 PROCEDURE — 81003 URINALYSIS AUTO W/O SCOPE: CPT

## 2021-07-14 PROCEDURE — 82607 VITAMIN B-12: CPT

## 2021-07-14 RX ORDER — CELECOXIB 200 MG/1
200 CAPSULE ORAL
Qty: 90 CAPSULE | Refills: 1 | Status: SHIPPED | OUTPATIENT
Start: 2021-07-14 | End: 2021-12-20 | Stop reason: SDUPTHER

## 2021-07-14 ASSESSMENT — FIBROSIS 4 INDEX: FIB4 SCORE: 1.33

## 2021-07-14 NOTE — ASSESSMENT & PLAN NOTE
This is a  chronic condition.   Last TSH level:   TSH   Date Value Ref Range Status   02/12/2021 2.840 0.380 - 5.330 uIU/mL Final     Comment:     Please note new reference ranges effective 12/14/2017 10:00 AM  Pregnant Females, 1st Trimester  0.050-3.700  Pregnant Females, 2nd Trimester  0.310-4.350  Pregnant Females, 3rd Trimester  0.410-5.180       Current Medication: levothyroxine 50 mcg   Side effects to medication: none

## 2021-07-14 NOTE — PROGRESS NOTES
CC:   Chief Complaint   Patient presents with   • Follow-Up   • Hyperlipidemia          HISTORY OF PRESENT ILLNESS: Patient is a 61 y.o. female established patient who presents today to follow up on the following conditions:     Health Maintenance: Completed  Pap- gynecologist - Dr. Valles   Mammogram- always has US done once a year. Did this in [place]- discussed updating this imaging.        Vision loss  Patient's last visit we were discussing sudden vision loss she had several months ago.  No recurrence of cysts.  I did order a CT the head and an ultrasound carotids concerned with her monocular vision loss.  Patient did get the ultrasound of her carotids done this was normal.  CT the head was not completed.  She went to eye doctor- they said normal eye exam.     Hyperlipidemia  This is a chronic condition.   Latest Labs:   Lab Results   Component Value Date/Time    CHOLSTRLTOT 185 02/12/2021 09:41 AM     (H) 02/12/2021 09:41 AM    HDL 64 02/12/2021 09:41 AM    TRIGLYCERIDE 94 02/12/2021 09:41 AM      Medications: atorvastatin 20 mg  Medication side effects: none    Risk calculator: The 10-year ASCVD risk score ([place] ONUR Jr., et al., 2013) is: 3.1%       Hypothyroidism, acquired, autoimmune  This is a  chronic condition.   Last TSH level:   TSH   Date Value Ref Range Status   02/12/2021 2.840 0.380 - 5.330 uIU/mL Final     Comment:     Please note new reference ranges effective 12/14/2017 10:00 AM  Pregnant Females, 1st Trimester  0.050-3.700  Pregnant Females, 2nd Trimester  0.310-4.350  Pregnant Females, 3rd Trimester  0.410-5.180       Current Medication: levothyroxine 50 mcg   Side effects to medication: none        Abnormal CBC      Denies any fever, chills, unintentional weight loss, night sweats, nausea, bone pain.    Arthritis  Left thumb bothers her the most. Taking celebrex, does help some. Some more pain recently. Discussed referral to ortho.     Fatigue  Feeling tired about 1 week ago. No fever,  chills, headaches, CP, SOB, night sweats, nausea, abdominal pain. No urinary symptoms. No abdominal pain.  No other associated symptoms with this.  She states that she has been eating normally and staying hydrated.      Patient Active Problem List    Diagnosis Date Noted   • Abnormal CBC 07/14/2021   • Arthritis 02/11/2021   • Vision loss 02/11/2021   • Vitamin D deficiency 12/26/2018   • Left foot pain 12/05/2018   • Renal mass 04/23/2018   • Ganglion cyst 04/23/2018   • Abnormal finding on imaging 04/02/2018   • Hypothyroidism, acquired, autoimmune 03/30/2016   • Multiple thyroid nodules 08/12/2015   • Nasal sinus cyst 08/12/2015   • Bilateral shoulder pain 08/12/2015   • Fatigue 07/24/2015   • Family history of heart disease in female family member before age 65 07/24/2015   • Hyperlipidemia 10/21/2014   • S/P laminectomy 10/21/2014   • Osteopenia 10/21/2014      Allergies:Patient has no known allergies.    Current Outpatient Medications   Medication Sig Dispense Refill   • celecoxib (CELEBREX) 200 MG Cap Take 1 capsule by mouth every day. 90 capsule 1   • atorvastatin (LIPITOR) 20 MG Tab Take 1 tablet by mouth every day. 90 tablet 3   • levothyroxine (SYNTHROID) 50 MCG Tab Take 1 tablet by mouth Every morning on an empty stomach. 60 tablet 3   • Nutritional Supplements (ESTROVEN PO) Take  by mouth.       No current facility-administered medications for this visit.       Social History     Tobacco Use   • Smoking status: Never Smoker   • Smokeless tobacco: Never Used   Vaping Use   • Vaping Use: Never used   Substance Use Topics   • Alcohol use: No     Alcohol/week: 0.0 oz   • Drug use: No     Social History     Social History Narrative   • Not on file       Family History   Problem Relation Age of Onset   • Heart Disease Mother    • Other Father         cirrhosis   • Diabetes Neg Hx    • Stroke Neg Hx        Review of Systems:    Constitutional: No Fevers, Chills  Eyes: No vision changes  ENT: No hearing  "changes  Resp: No Shortness of breath  CV: No Chest pain  GI: No Nausea/Vomiting  MSK: No weakness  Skin: No rashes  Neuro: No Headaches  Psych: No Suicidal ideations    All remaining systems reviewed and found to be negative, except as stated above.    Exam:    /70   Pulse (!) 57   Temp 36.1 °C (97 °F) (Temporal)   Resp 12   Ht 1.727 m (5' 8\")   Wt 67.1 kg (148 lb)   SpO2 96%  Body mass index is 22.5 kg/m².    General:  Well nourished, well developed female in NAD  HENT: Atraumatic, normocephalic  EYES: Extraocular movements intact  NECK: Supple, FROM  CHEST: No deformities, Equal chest expansion  RESP: Unlabored, no stridor or audible wheeze  HEART: Regular Rate and rhythm.   Extremities: No Clubbing, Cyanosis, or Edema  Skin: Warm/dry, without rashes  Neuro: A/O x 4, due to COVID-19- did not have patient remove face mask to test cranial nerves.  Motor/sensory grossly intact  Psych: Normal behavior, normal affect      Lab review:  Labs are reviewed and discussed with a patient  Lab Results   Component Value Date/Time    CHOLSTRLTOT 185 02/12/2021 09:41 AM     (H) 02/12/2021 09:41 AM    HDL 64 02/12/2021 09:41 AM    TRIGLYCERIDE 94 02/12/2021 09:41 AM       Lab Results   Component Value Date/Time    SODIUM 142 02/12/2021 09:41 AM    POTASSIUM 4.3 02/12/2021 09:41 AM    CHLORIDE 105 02/12/2021 09:41 AM    CO2 28 02/12/2021 09:41 AM    GLUCOSE 88 02/12/2021 09:41 AM    BUN 16 02/12/2021 09:41 AM    CREATININE 0.80 02/12/2021 09:41 AM     Lab Results   Component Value Date/Time    ALKPHOSPHAT 79 02/12/2021 09:41 AM    ASTSGOT 21 02/12/2021 09:41 AM    ALTSGPT 26 02/12/2021 09:41 AM    TBILIRUBIN 0.8 02/12/2021 09:41 AM      No results found for: HBA1C  No results found for: TSH  Lab Results   Component Value Date/Time    FREET4 1.02 02/12/2021 09:41 AM    FREET4 1.19 10/22/2020 09:08 AM       Lab Results   Component Value Date/Time    WBC 4.2 (L) 02/12/2021 09:41 AM    RBC 4.63 02/12/2021 09:41 AM "    HEMOGLOBIN 13.4 02/12/2021 09:41 AM    HEMATOCRIT 41.1 02/12/2021 09:41 AM    MCV 88.8 02/12/2021 09:41 AM    MCH 28.9 02/12/2021 09:41 AM    MCHC 32.6 (L) 02/12/2021 09:41 AM    MPV 10.8 02/12/2021 09:41 AM    NEUTSPOLYS 52.40 02/12/2021 09:41 AM    LYMPHOCYTES 38.10 02/12/2021 09:41 AM    MONOCYTES 6.40 02/12/2021 09:41 AM    EOSINOPHILS 1.70 02/12/2021 09:41 AM    BASOPHILS 1.20 02/12/2021 09:41 AM          Assessment/Plan:  1. Vision loss  Carotid ultrasound reviewed with patient today and this was normal.  In addition she states she did go to the eye doctor and had normal eye exam.  Discussed with patient proceeding with CT of the head, patient declines at this time.  Denies any headaches.  No recurrence of the sudden vision loss since she had her prior exam.  2. Mixed hyperlipidemia  Chronic condition.  Stable.  Patient continues atorvastatin 20 mg daily.  3. Hypothyroidism, acquired, autoimmune  - TSH; Future  Chronic condition, last TSH in February 2021 was within normal limits.  Discussed with patient updating her labs now due to the new onset fatigue she is had for 1 week.  She continues to take levothyroxine 50 mcg daily.  4. Abnormal CBC  - CBC WITH DIFFERENTIAL; Future  White blood cell count tends to run in the fours, denies any red flag symptoms.  Due for updated CBC now.  Discussed with patient further work-up, doing work-up for her fatigue and will repeat CBC first.  5. Primary osteoarthritis involving multiple joints  - celecoxib (CELEBREX) 200 MG Cap; Take 1 capsule by mouth every day.  Dispense: 90 capsule; Refill: 1  Patient having left thenar pain.  Suspicious for osteoarthritis.  Celebrex does help most of her joint pain needs refill today.  Discussed with patient that if her left thumb pain increases can send her to hand specialist for joint injection.  6. Arthritis    7. Fatigue, unspecified type  - CBC WITH DIFFERENTIAL; Future  - VITAMIN B12; Future  - FOLATE; Future  - Comp Metabolic  Panel; Future  - URINALYSIS,CULTURE IF INDICATED; Future  - FERRITIN; Future  - IRON/TOTAL IRON BIND; Future  - EKG - Clinic Performed  Unsure etiology of her sudden fatigue for the last week.  Denies any symptoms that would lead me to believe infectious component.  Denies any chest pain, shortness of breath or palpitations.  EKG in office showed:  Normal sinus rhythm. Denies any urinary symptoms.  No abdominal pain.  No upper respiratory infection symptoms.  Do believe would be beneficial to repeat her labs now to ensure her thyroid is stable and normal.  Rule out other common causes of fatigue such as anemia, getting her vitamin levels.  Getting her electrolyte levels.  Blood pressure is low normal, no syncopal episodes.  States she is staying hydrated and eats regularly.  I would like to follow-up closely with this patient and see her back in 1 week.  8. Encounter for screening for malignant neoplasm of breast, unspecified screening modality  - US-SCREENING WHOLE BREAST BILATERAL (3D SCREENING); Future  Patient used to get annual ultrasounds when she lived in [place].  Has not had one for a few years.  Discussed updating now.  Declines mammogram.  She is agreeable to updating ultrasound.    Follow-up: Return in about 1 week (around 7/21/2021) for Follow up on labs.    Please note that this dictation was created using voice recognition software. I have made every reasonable attempt to correct obvious errors, but I expect that there are errors of grammar and possibly content that I did not discover before finalizing the note.

## 2021-07-14 NOTE — ASSESSMENT & PLAN NOTE
Patient's last visit we were discussing sudden vision loss she had several months ago.  No recurrence of cysts.  I did order a CT the head and an ultrasound carotids concerned with her monocular vision loss.  Patient did get the ultrasound of her carotids done this was normal.  CT the head was not completed.  She went to eye doctor- they said normal eye exam.

## 2021-07-14 NOTE — ASSESSMENT & PLAN NOTE
This is a chronic condition.   Latest Labs:   Lab Results   Component Value Date/Time    CHOLSTRLTOT 185 02/12/2021 09:41 AM     (H) 02/12/2021 09:41 AM    HDL 64 02/12/2021 09:41 AM    TRIGLYCERIDE 94 02/12/2021 09:41 AM      Medications: atorvastatin 20 mg  Medication side effects: none    Risk calculator: The 10-year ASCVD risk score ([place] MOHR Jr., et al., 2013) is: 3.1%

## 2021-07-14 NOTE — ASSESSMENT & PLAN NOTE
Feeling tired about 1 week ago. No fever, chills, headaches, CP, SOB, night sweats, nausea, abdominal pain. No urinary symptoms. No abdominal pain.  No other associated symptoms with this.  She states that she has been eating normally and staying hydrated.

## 2021-07-14 NOTE — ASSESSMENT & PLAN NOTE
Left thumb bothers her the most. Taking celebrex, does help some. Some more pain recently. Discussed referral to ortho.

## 2021-07-15 ENCOUNTER — TELEPHONE (OUTPATIENT)
Dept: MEDICAL GROUP | Facility: PHYSICIAN GROUP | Age: 61
End: 2021-07-15

## 2021-07-15 LAB
ALBUMIN SERPL BCP-MCNC: 4.7 G/DL (ref 3.2–4.9)
ALBUMIN/GLOB SERPL: 1.6 G/DL
ALP SERPL-CCNC: 88 U/L (ref 30–99)
ALT SERPL-CCNC: 21 U/L (ref 2–50)
ANION GAP SERPL CALC-SCNC: 9 MMOL/L (ref 7–16)
AST SERPL-CCNC: 22 U/L (ref 12–45)
BILIRUB SERPL-MCNC: 0.5 MG/DL (ref 0.1–1.5)
BUN SERPL-MCNC: 19 MG/DL (ref 8–22)
CALCIUM SERPL-MCNC: 9.7 MG/DL (ref 8.5–10.5)
CHLORIDE SERPL-SCNC: 105 MMOL/L (ref 96–112)
CO2 SERPL-SCNC: 28 MMOL/L (ref 20–33)
CREAT SERPL-MCNC: 0.82 MG/DL (ref 0.5–1.4)
FERRITIN SERPL-MCNC: 123 NG/ML (ref 10–291)
FOLATE SERPL-MCNC: 16.8 NG/ML
GLOBULIN SER CALC-MCNC: 3 G/DL (ref 1.9–3.5)
GLUCOSE SERPL-MCNC: 92 MG/DL (ref 65–99)
IRON SATN MFR SERPL: 27 % (ref 15–55)
IRON SERPL-MCNC: 69 UG/DL (ref 40–170)
POTASSIUM SERPL-SCNC: 4.8 MMOL/L (ref 3.6–5.5)
PROT SERPL-MCNC: 7.7 G/DL (ref 6–8.2)
SODIUM SERPL-SCNC: 142 MMOL/L (ref 135–145)
TIBC SERPL-MCNC: 256 UG/DL (ref 250–450)
TSH SERPL DL<=0.005 MIU/L-ACNC: 2.03 UIU/ML (ref 0.38–5.33)
UIBC SERPL-MCNC: 187 UG/DL (ref 110–370)
VIT B12 SERPL-MCNC: 1050 PG/ML (ref 211–911)

## 2021-07-21 ENCOUNTER — OFFICE VISIT (OUTPATIENT)
Dept: MEDICAL GROUP | Facility: PHYSICIAN GROUP | Age: 61
End: 2021-07-21
Payer: COMMERCIAL

## 2021-07-21 VITALS
OXYGEN SATURATION: 98 % | BODY MASS INDEX: 22.43 KG/M2 | HEIGHT: 68 IN | RESPIRATION RATE: 12 BRPM | HEART RATE: 55 BPM | SYSTOLIC BLOOD PRESSURE: 100 MMHG | TEMPERATURE: 98 F | WEIGHT: 148 LBS | DIASTOLIC BLOOD PRESSURE: 62 MMHG

## 2021-07-21 DIAGNOSIS — N32.9 BLADDER PROBLEM: ICD-10-CM

## 2021-07-21 DIAGNOSIS — N28.9 KIDNEY PROBLEM: ICD-10-CM

## 2021-07-21 DIAGNOSIS — R53.83 FATIGUE, UNSPECIFIED TYPE: ICD-10-CM

## 2021-07-21 PROCEDURE — 99213 OFFICE O/P EST LOW 20 MIN: CPT | Performed by: PHYSICIAN ASSISTANT

## 2021-07-21 ASSESSMENT — FIBROSIS 4 INDEX: FIB4 SCORE: 1.19

## 2021-07-21 NOTE — ASSESSMENT & PLAN NOTE
Patient states she has a history of right kidney is lower than left. She does have discomfort in her bladder as well. She's seen gynecologist for this- has not been seen in a year. She's having issues with bladder control. Last year has become worse. UA was negative.   Discussed updated US today.   2018 US showed:     IMPRESSION:     Subcentimeter echogenic left renal mass most likely is an angiomyolipoma     Complex left renal cyst measures 6 mm

## 2021-07-21 NOTE — ASSESSMENT & PLAN NOTE
The patient's last visit we discussed feeling of fatigue x1 week.  She had no other systemic or added symptoms.  Reviewed labs in detail today, unremarkable other than a slightly elevated B12.  Discussed reducing supplementation.    She is feeling about 20 % better.

## 2021-07-21 NOTE — PROGRESS NOTES
CC:   Chief Complaint   Patient presents with   • Lab Results   • Bladder Problem          HISTORY OF PRESENT ILLNESS: Patient is a 61 y.o. female established patient who presents today to follow up on the following conditions:       Fatigue  The patient's last visit we discussed feeling of fatigue x1 week.  She had no other systemic or added symptoms.  Reviewed labs in detail today, unremarkable other than a slightly elevated B12.  Discussed reducing supplementation.    She is feeling about 20 % better.     Kidney problem  Patient states she has a history of right kidney is lower than left. She does have discomfort in her bladder as well. She's seen gynecologist for this- has not been seen in a year. She's having issues with bladder control. Last year has become worse. UA was negative.   Discussed updated US today.   2018 US showed:     IMPRESSION:     Subcentimeter echogenic left renal mass most likely is an angiomyolipoma     Complex left renal cyst measures 6 mm      Patient Active Problem List    Diagnosis Date Noted   • Kidney problem 07/21/2021   • Abnormal CBC 07/14/2021   • Arthritis 02/11/2021   • Vision loss 02/11/2021   • Vitamin D deficiency 12/26/2018   • Left foot pain 12/05/2018   • Renal mass 04/23/2018   • Ganglion cyst 04/23/2018   • Abnormal finding on imaging 04/02/2018   • Hypothyroidism, acquired, autoimmune 03/30/2016   • Multiple thyroid nodules 08/12/2015   • Nasal sinus cyst 08/12/2015   • Bilateral shoulder pain 08/12/2015   • Fatigue 07/24/2015   • Family history of heart disease in female family member before age 65 07/24/2015   • Hyperlipidemia 10/21/2014   • S/P laminectomy 10/21/2014   • Osteopenia 10/21/2014      Allergies:Patient has no known allergies.    Current Outpatient Medications   Medication Sig Dispense Refill   • celecoxib (CELEBREX) 200 MG Cap Take 1 capsule by mouth every day. 90 capsule 1   • atorvastatin (LIPITOR) 20 MG Tab Take 1 tablet by mouth every day. 90 tablet  "3   • levothyroxine (SYNTHROID) 50 MCG Tab Take 1 tablet by mouth Every morning on an empty stomach. 60 tablet 3   • Nutritional Supplements (ESTROVEN PO) Take  by mouth.       No current facility-administered medications for this visit.       Social History     Tobacco Use   • Smoking status: Never Smoker   • Smokeless tobacco: Never Used   Vaping Use   • Vaping Use: Never used   Substance Use Topics   • Alcohol use: No     Alcohol/week: 0.0 oz   • Drug use: No     Social History     Social History Narrative   • Not on file       Family History   Problem Relation Age of Onset   • Heart Disease Mother    • Other Father         cirrhosis   • Diabetes Neg Hx    • Stroke Neg Hx        Review of Systems:    Constitutional: No Fevers, Chills  Eyes: No vision changes  ENT: No hearing changes  Resp: No Shortness of breath  CV: No Chest pain  GI: No Nausea/Vomiting  MSK: No weakness  Skin: No rashes  Neuro: No Headaches  Psych: No Suicidal ideations    All remaining systems reviewed and found to be negative, except as stated above.    Exam:    /62   Pulse (!) 55   Temp 36.7 °C (98 °F) (Temporal)   Resp 12   Ht 1.727 m (5' 8\")   Wt 67.1 kg (148 lb)   SpO2 98%  Body mass index is 22.5 kg/m².    Constitutional: Alert, no distress, well-groomed.  Skin: Warm, dry, good turgor, no rashes in visible areas.  Eye: Equal, round and reactive, conjunctiva clear, lids normal.  ENMT: Lips without lesions, good dentition, moist mucous membranes.  Neck: Trachea midline, no masses, no thyromegaly.  Respiratory: Unlabored respiratory effort, no cough.  MSK: Normal gait, moves all extremities.  Neuro: Grossly non-focal.   Psych: Alert and oriented x3, normal affect and mood.    Lab review:  Labs are reviewed and discussed with a patient  Lab Results   Component Value Date/Time    CHOLSTRLTOT 185 02/12/2021 09:41 AM     (H) 02/12/2021 09:41 AM    HDL 64 02/12/2021 09:41 AM    TRIGLYCERIDE 94 02/12/2021 09:41 AM       Lab " Results   Component Value Date/Time    SODIUM 142 07/14/2021 03:17 PM    POTASSIUM 4.8 07/14/2021 03:17 PM    CHLORIDE 105 07/14/2021 03:17 PM    CO2 28 07/14/2021 03:17 PM    GLUCOSE 92 07/14/2021 03:17 PM    BUN 19 07/14/2021 03:17 PM    CREATININE 0.82 07/14/2021 03:17 PM     Lab Results   Component Value Date/Time    ALKPHOSPHAT 88 07/14/2021 03:17 PM    ASTSGOT 22 07/14/2021 03:17 PM    ALTSGPT 21 07/14/2021 03:17 PM    TBILIRUBIN 0.5 07/14/2021 03:17 PM      No results found for: HBA1C  No results found for: TSH  Lab Results   Component Value Date/Time    FREET4 1.02 02/12/2021 09:41 AM    FREET4 1.19 10/22/2020 09:08 AM       Lab Results   Component Value Date/Time    WBC 6.1 07/14/2021 03:17 PM    RBC 4.77 07/14/2021 03:17 PM    HEMOGLOBIN 13.6 07/14/2021 03:17 PM    HEMATOCRIT 41.5 07/14/2021 03:17 PM    MCV 87.0 07/14/2021 03:17 PM    MCH 28.5 07/14/2021 03:17 PM    MCHC 32.8 (L) 07/14/2021 03:17 PM    MPV 10.0 07/14/2021 03:17 PM    NEUTSPOLYS 55.80 07/14/2021 03:17 PM    LYMPHOCYTES 36.30 07/14/2021 03:17 PM    MONOCYTES 6.10 07/14/2021 03:17 PM    EOSINOPHILS 0.70 07/14/2021 03:17 PM    BASOPHILS 0.80 07/14/2021 03:17 PM          Assessment/Plan:  1. Fatigue, unspecified type  Resolving, patient states she will continue to monitor this.  Discussed with patient that if the fatigue continues there is further labs or imaging that we could proceed with.  Again she has no other symptoms associated with this.  She is about 20% improved from last week.  2. Kidney problem  - US-ABDOMEN COMPLETE SURVEY; Future  3. Bladder problem  - US-ABDOMEN COMPLETE SURVEY; Future  Patient is having progressively worsening urinary symptoms, urgency and incontinence.  She last saw her gynecologist about this issue about a year ago.  Saw Dr. Valles.  Patient states she also has a history of 1 kidney being lower than the other and that she was told this could be an influencing factor to her symptoms.  I also see based on her  ultrasound from 2018 that she does have a history of a small angiolipoma on the left.  And a complex renal cyst on the left.  I think with these findings and her symptoms it would be appropriate to update an ultrasound to reevaluate her kidneys ureters and bladder at this time.  Discussed with patient that for her symptoms could potentially refer her to urology or Dr. Nila Kimble for further management.  We will get ultrasound first, pending results will create a plan.    Follow-up: Return for Follow-up pending ultrasound results.    Please note that this dictation was created using voice recognition software. I have made every reasonable attempt to correct obvious errors, but I expect that there are errors of grammar and possibly content that I did not discover before finalizing the note.

## 2021-08-19 ENCOUNTER — HOSPITAL ENCOUNTER (OUTPATIENT)
Dept: RADIOLOGY | Facility: MEDICAL CENTER | Age: 61
End: 2021-08-19
Attending: PHYSICIAN ASSISTANT
Payer: COMMERCIAL

## 2021-08-19 ENCOUNTER — TELEPHONE (OUTPATIENT)
Dept: MEDICAL GROUP | Facility: PHYSICIAN GROUP | Age: 61
End: 2021-08-19

## 2021-08-19 DIAGNOSIS — N28.9 KIDNEY PROBLEM: ICD-10-CM

## 2021-08-19 DIAGNOSIS — N32.9 BLADDER PROBLEM: ICD-10-CM

## 2021-08-19 PROCEDURE — 76775 US EXAM ABDO BACK WALL LIM: CPT

## 2021-08-19 NOTE — TELEPHONE ENCOUNTER
----- Message from Belen Trinidad P.A.-C. sent at 8/19/2021 10:13 AM PDT -----  Please call patient about their results.     Results showed: Reassuring news, no significant changes compared to previous imaging.  Small benign lesion on the left kidney is unchanged.     Thank you,    Belen Trinidad PA-C

## 2021-10-27 ENCOUNTER — OFFICE VISIT (OUTPATIENT)
Dept: ENDOCRINOLOGY | Facility: MEDICAL CENTER | Age: 61
End: 2021-10-27
Attending: INTERNAL MEDICINE
Payer: COMMERCIAL

## 2021-10-27 VITALS
OXYGEN SATURATION: 94 % | DIASTOLIC BLOOD PRESSURE: 64 MMHG | HEIGHT: 68 IN | WEIGHT: 149 LBS | SYSTOLIC BLOOD PRESSURE: 116 MMHG | HEART RATE: 74 BPM | BODY MASS INDEX: 22.58 KG/M2 | RESPIRATION RATE: 16 BRPM

## 2021-10-27 DIAGNOSIS — Z78.0 POSTMENOPAUSAL: ICD-10-CM

## 2021-10-27 DIAGNOSIS — E55.9 VITAMIN D DEFICIENCY: ICD-10-CM

## 2021-10-27 DIAGNOSIS — M19.90 ARTHRITIS: ICD-10-CM

## 2021-10-27 DIAGNOSIS — M85.89 OSTEOPENIA OF MULTIPLE SITES: ICD-10-CM

## 2021-10-27 DIAGNOSIS — E06.3 HYPOTHYROIDISM, ACQUIRED, AUTOIMMUNE: ICD-10-CM

## 2021-10-27 DIAGNOSIS — E04.2 MULTIPLE THYROID NODULES: ICD-10-CM

## 2021-10-27 PROCEDURE — 99211 OFF/OP EST MAY X REQ PHY/QHP: CPT | Performed by: INTERNAL MEDICINE

## 2021-10-27 PROCEDURE — 99214 OFFICE O/P EST MOD 30 MIN: CPT | Performed by: INTERNAL MEDICINE

## 2021-10-27 ASSESSMENT — FIBROSIS 4 INDEX: FIB4 SCORE: 1.19

## 2021-10-28 NOTE — PROGRESS NOTES
"Chief Complaint   Patient presents with   • Hypothyroidism     Chronic autoimmune thyroiditis   • Follow-Up     Thyroid nodules   • Osteopenia        HPI:    Hypothyroidism         Currently euthyroid taking levothyroxine 50 mcg/day.  She has taken a higher dose but not tolerated.  Her most recent  TSH is 2.0 done July this year.    Multiple thyroid nodules.           She has chronic thyroiditis and her gland is described as very heterogeneous on ultrasound.  I think it is part of the problem with following the nodules.  She has had 2 FNA biopsies in the past that were \"insufficient\".  Her last ultrasound 2 years ago indicated no change compared to 2016.  We will update her ultrasound.    Osteopenia        I asked her to do a bone density last year and I think she wanted to avoid any exposure to COVID-19.  Her last bone density was 2014 which was not favorable with a lumbar spine T score -1.5 and her femur is 2.1.  So we will update bone density now.    ROS:  A chronic problem but becoming worse recently is that her hands are feeling arthritic with stiffness discomfort in the joints.  Her finger joints are enlarged but not inflamed.  Her mother had arthritis but apparently not RA.  She would like a test for arthritis and I explained that it is probably degenerative and should see a rheumatologist.  I will do a rheumatoid factor simply because this is very important type of arthritis with a  good treatment possibility.      Allergies: No Known Allergies    Current medicines including changes today:  Current Outpatient Medications   Medication Sig Dispense Refill   • celecoxib (CELEBREX) 200 MG Cap Take 1 capsule by mouth every day. 90 capsule 1   • atorvastatin (LIPITOR) 20 MG Tab Take 1 tablet by mouth every day. 90 tablet 3   • levothyroxine (SYNTHROID) 50 MCG Tab Take 1 tablet by mouth Every morning on an empty stomach. 60 tablet 3   • Nutritional Supplements (ESTROVEN PO) Take  by mouth.       No current " "facility-administered medications for this visit.        Past Medical History:   Diagnosis Date   • Chronic autoimmune thyroiditis      + US / + TPO =293   • Hyperlipidemia 10/21/2014   • Hypothyroidism    • Multiple thyroid nodules        PHYSICAL EXAM:    /64 (BP Location: Left arm, Patient Position: Sitting, BP Cuff Size: Adult)   Pulse 74   Resp 16   Ht 1.727 m (5' 8\")   Wt 67.6 kg (149 lb)   SpO2 94%   BMI 22.66 kg/m²     Gen.   appears healthy in some years younger than her stated age    Skin   unremarkable except she appears younger than her age    HEENT  unremarkable    Neck   thyroid gland is relatively small or possibly substernal.  I feel 2 small nodules in the area each lobe.  No satellite nodules elsewhere in the neck or supraclavicular areas.    Heart  regular    Extremities    hands do not suggest rheumatoid arthritis there is no acute inflammation but her joints are knobby consistent osteoarthritis.    Neuro  gait and station normal    Psych  appropriate    ASSESSMENT AND RECOMMENDATIONS    1. Hypothyroidism, acquired, autoimmune           Probably euthyroid taking levothyroxine 50 mcg/day.           Update thyroid levels    2. Multiple thyroid nodules               See HPI                Update thyroid ultrasound    3. Osteopenia of multiple sites              Update bone density and further discuss       DISPOSITION: Follow-up test results by MyChart or telephone.                            I indicated that I will be retiring at the end of this year.  We do have staff to continue to follow her in the future if she chooses.  I will discuss further when I see proposed laboratory tests      Fede Coello M.D.    Copies to: MILLER FaustinC. [phone]  "

## 2021-11-01 ENCOUNTER — HOSPITAL ENCOUNTER (OUTPATIENT)
Dept: LAB | Facility: MEDICAL CENTER | Age: 61
End: 2021-11-01
Attending: INTERNAL MEDICINE
Payer: COMMERCIAL

## 2021-11-01 DIAGNOSIS — M19.90 ARTHRITIS: ICD-10-CM

## 2021-11-01 DIAGNOSIS — E06.3 HYPOTHYROIDISM, ACQUIRED, AUTOIMMUNE: ICD-10-CM

## 2021-11-01 LAB
ERYTHROCYTE [SEDIMENTATION RATE] IN BLOOD BY WESTERGREN METHOD: 8 MM/HOUR (ref 0–25)
RHEUMATOID FACT SER IA-ACNC: <10 IU/ML (ref 0–14)
T4 FREE SERPL-MCNC: 1 NG/DL (ref 0.93–1.7)
TSH SERPL DL<=0.005 MIU/L-ACNC: 8.14 UIU/ML (ref 0.38–5.33)

## 2021-11-01 PROCEDURE — 86431 RHEUMATOID FACTOR QUANT: CPT

## 2021-11-01 PROCEDURE — 84439 ASSAY OF FREE THYROXINE: CPT

## 2021-11-01 PROCEDURE — 84443 ASSAY THYROID STIM HORMONE: CPT

## 2021-11-01 PROCEDURE — 36415 COLL VENOUS BLD VENIPUNCTURE: CPT

## 2021-11-01 PROCEDURE — 85652 RBC SED RATE AUTOMATED: CPT

## 2021-11-05 DIAGNOSIS — E06.3 HYPOTHYROIDISM, ACQUIRED, AUTOIMMUNE: ICD-10-CM

## 2021-11-06 ENCOUNTER — TELEPHONE (OUTPATIENT)
Dept: ENDOCRINOLOGY | Facility: MEDICAL CENTER | Age: 61
End: 2021-11-06

## 2021-11-06 DIAGNOSIS — E06.3 HYPOTHYROIDISM, ACQUIRED, AUTOIMMUNE: ICD-10-CM

## 2021-11-06 RX ORDER — LEVOTHYROXINE SODIUM 0.05 MG/1
50 TABLET ORAL
Qty: 60 TABLET | Refills: 0 | OUTPATIENT
Start: 2021-11-06

## 2021-11-06 RX ORDER — LEVOTHYROXINE SODIUM 0.07 MG/1
75 TABLET ORAL
Qty: 90 TABLET | Refills: 1 | Status: SHIPPED | OUTPATIENT
Start: 2021-11-06 | End: 2022-03-04 | Stop reason: SDUPTHER

## 2021-11-06 NOTE — TELEPHONE ENCOUNTER
Conversation with patient    Thyroid blood test results reviewed.  TSH is elevated at 8.1 and free T4 is low normal at 1.0.  Rheumatoid factor is negative.  Sed rate is normal.    She is taking levothyroxine 50 mcg/day.  We will increase the dose to 75 mcg.  This may help her arthralgias.  We will review again in office in about 2 months.  She understands that I am retiring and I will make an appointment for her to see Joshua in follow-up.  She will update lab at that time.  She is in agreement.    Fede Coello M.D.

## 2021-11-17 DIAGNOSIS — E06.3 HYPOTHYROIDISM, ACQUIRED, AUTOIMMUNE: ICD-10-CM

## 2021-12-20 DIAGNOSIS — M15.9 PRIMARY OSTEOARTHRITIS INVOLVING MULTIPLE JOINTS: ICD-10-CM

## 2021-12-20 RX ORDER — CELECOXIB 200 MG/1
200 CAPSULE ORAL
Qty: 90 CAPSULE | Refills: 0 | Status: SHIPPED | OUTPATIENT
Start: 2021-12-20 | End: 2022-08-02 | Stop reason: SDUPTHER

## 2022-02-12 DIAGNOSIS — E78.5 HYPERLIPIDEMIA, UNSPECIFIED HYPERLIPIDEMIA TYPE: ICD-10-CM

## 2022-02-18 RX ORDER — ATORVASTATIN CALCIUM 20 MG/1
20 TABLET, FILM COATED ORAL DAILY
Qty: 90 TABLET | Refills: 0 | Status: SHIPPED | OUTPATIENT
Start: 2022-02-18 | End: 2022-08-02

## 2022-02-18 NOTE — TELEPHONE ENCOUNTER
Requested Prescriptions     Pending Prescriptions Disp Refills   • atorvastatin (LIPITOR) 20 MG Tab [Pharmacy Med Name: ATORVASTATIN 20MG TABLETS] 90 Tablet 0     Sig: TAKE 1 TABLET BY MOUTH EVERY DAY

## 2022-03-02 ENCOUNTER — HOSPITAL ENCOUNTER (OUTPATIENT)
Dept: LAB | Facility: MEDICAL CENTER | Age: 62
End: 2022-03-02
Attending: INTERNAL MEDICINE
Payer: COMMERCIAL

## 2022-03-02 DIAGNOSIS — E06.3 HYPOTHYROIDISM, ACQUIRED, AUTOIMMUNE: ICD-10-CM

## 2022-03-02 LAB
T4 FREE SERPL-MCNC: 1.17 NG/DL (ref 0.93–1.7)
TSH SERPL DL<=0.005 MIU/L-ACNC: 1.31 UIU/ML (ref 0.38–5.33)

## 2022-03-02 PROCEDURE — 84443 ASSAY THYROID STIM HORMONE: CPT

## 2022-03-02 PROCEDURE — 36415 COLL VENOUS BLD VENIPUNCTURE: CPT

## 2022-03-02 PROCEDURE — 84439 ASSAY OF FREE THYROXINE: CPT

## 2022-03-04 ENCOUNTER — OFFICE VISIT (OUTPATIENT)
Dept: ENDOCRINOLOGY | Facility: MEDICAL CENTER | Age: 62
End: 2022-03-04
Attending: INTERNAL MEDICINE
Payer: COMMERCIAL

## 2022-03-04 VITALS
WEIGHT: 146 LBS | BODY MASS INDEX: 22.13 KG/M2 | HEART RATE: 84 BPM | DIASTOLIC BLOOD PRESSURE: 70 MMHG | OXYGEN SATURATION: 95 % | HEIGHT: 68 IN | SYSTOLIC BLOOD PRESSURE: 118 MMHG

## 2022-03-04 DIAGNOSIS — E06.3 HYPOTHYROIDISM, ACQUIRED, AUTOIMMUNE: ICD-10-CM

## 2022-03-04 DIAGNOSIS — E55.9 VITAMIN D DEFICIENCY: ICD-10-CM

## 2022-03-04 DIAGNOSIS — Z78.0 POSTMENOPAUSAL: ICD-10-CM

## 2022-03-04 DIAGNOSIS — M85.89 OSTEOPENIA OF MULTIPLE SITES: ICD-10-CM

## 2022-03-04 PROCEDURE — 99212 OFFICE O/P EST SF 10 MIN: CPT | Performed by: INTERNAL MEDICINE

## 2022-03-04 PROCEDURE — 99214 OFFICE O/P EST MOD 30 MIN: CPT | Performed by: INTERNAL MEDICINE

## 2022-03-04 RX ORDER — MULTIVIT WITH MINERALS/LUTEIN
TABLET ORAL
COMMUNITY

## 2022-03-04 RX ORDER — MULTIVIT-MIN/IRON/FOLIC ACID/K 18-600-40
CAPSULE ORAL
COMMUNITY

## 2022-03-04 RX ORDER — LEVOTHYROXINE SODIUM 0.07 MG/1
75 TABLET ORAL
Qty: 90 TABLET | Refills: 3 | Status: SHIPPED | OUTPATIENT
Start: 2022-03-04 | End: 2022-08-02 | Stop reason: SDUPTHER

## 2022-03-04 ASSESSMENT — PATIENT HEALTH QUESTIONNAIRE - PHQ9: CLINICAL INTERPRETATION OF PHQ2 SCORE: 0

## 2022-03-04 ASSESSMENT — FIBROSIS 4 INDEX: FIB4 SCORE: 1.19

## 2022-03-04 NOTE — PROGRESS NOTES
Chief Complaint: Follow up for Primary Hypothyroidism secondary to Hashimoto's thyroiditis, multiple thyroid nodules, osteopenia    HPI:     Vivi Sommer is a 61 y.o. female here for follow up of the above medical issues. She was previously a patient of Dr. Jnoes  She has Hashimoto's thyroiditis. TPO antibodies at baseline were over 200.  She denies a family history of thyroid cancer. She has a history of multiple thyroid nodules with dominant hypoechoic solid nodule measuring 1.2 cm on the left lower lobe which was previously biopsied in 2015 with nondiagnostic findings. She has declined repeat biopsy. Her last ultrasound was in 2019 which showed stability of the nodules.    She also has osteopenia based on last bone density from October 7, 2014 showing the lowest T score of -2.1 for the left femoral neck with FRAX scores that are not significantly elevated. Her 10-year risk for any major osteoporotic fracture was 9.3%. Her 10-year risk for any major hip fracture was 1.7%    She is taking calcium and vitamin D  We do not have updated labs  She denies interval falls and fractures  She reports that she exercises regularly  We do not have an updated bone density        Since last visit patient reports feeling excellent.  She remains on Levothyroxine 75 MCG daily which has been her dose for the past 6 months.   She reports excellent compliance and denies missing any daily doses.   She takes thyroid hormone prior to breakfast.   She  denies taking any iron, calcium supplements or antacids.      Weight has been stable    She currently denies denies fatigue, weight changes, heat/cold intolerance, bowel/skin changes or CVS symptoms.       Her last TSH was normal at 1.3 with a free T4 of 1.17 on March 2, 2022      Patient's medications, allergies, and social histories were reviewed and updated as appropriate.      ROS:     CONS:     No fever, no chills   EYES:     No diplopia, no blurry vision   CV:            "No chest pain, no palpitations   PULM:     No SOB, no cough, no hemoptysis.   GI:            No nausea, no vomiting, no diarrhea, no constipation   ENDO:     No polyuria, no polydipsia, no heat intolerance, no cold intolerance       Past Medical History:  Problem List:  2021-07: Kidney problem  2021-07: Abnormal CBC  2021-02: Arthritis  2021-02: Vision loss  2018-12: Vitamin D deficiency  2018-12: Left foot pain  2018-04: Renal mass  2018-04: Ganglion cyst  2018-04: Encounter to establish care with new doctor  2018-04: Thyroid nodule  2018-04: Abnormal finding on imaging  2016-03: Hypothyroidism, acquired, autoimmune  2015-08: Hypothyroidism  2015-08: Multiple thyroid nodules  2015-08: Nasal sinus cyst  2015-08: Bilateral shoulder pain  2015-07: Fatigue  2015-07: Family history of heart disease in female family member   before age 65  2014-10: Hyperlipidemia  2014-10: S/P laminectomy  2014-10: Osteopenia      Past Surgical History:  Past Surgical History:   Procedure Laterality Date   • OTHER ORTHOPEDIC SURGERY      L2-3 discectomy        Allergies:  Patient has no known allergies.     Social History:  Social History     Tobacco Use   • Smoking status: Never Smoker   • Smokeless tobacco: Never Used   Vaping Use   • Vaping Use: Never used   Substance Use Topics   • Alcohol use: No     Alcohol/week: 0.0 oz   • Drug use: No        Family History:   family history includes Heart Disease in her mother; Other in her father.      PHYSICAL EXAM:   Vital signs: /70   Pulse 84   Ht 1.727 m (5' 8\")   Wt 66.2 kg (146 lb)   SpO2 95%   BMI 22.20 kg/m²   GENERAL: Well-developed, well-nourished in no apparent distress.   EYE:  No ocular asymmetry, PERRLA  HENT: Pink, moist mucous membranes.    NECK: No thyromegaly.   CARDIOVASCULAR:  No murmurs  LUNGS: Clear breath sounds  ABDOMEN: Soft, nontender   EXTREMITIES: No clubbing, cyanosis, or edema.   NEUROLOGICAL: No gross focal motor abnormalities   LYMPH: No cervical " adenopathy palpated.   SKIN: No rashes, lesions.       Labs:  Lab Results   Component Value Date/Time    SODIUM 142 07/14/2021 03:17 PM    POTASSIUM 4.8 07/14/2021 03:17 PM    CHLORIDE 105 07/14/2021 03:17 PM    CO2 28 07/14/2021 03:17 PM    ANION 9.0 07/14/2021 03:17 PM    GLUCOSE 92 07/14/2021 03:17 PM    BUN 19 07/14/2021 03:17 PM    CREATININE 0.82 07/14/2021 03:17 PM    CALCIUM 9.7 07/14/2021 03:17 PM    ASTSGOT 22 07/14/2021 03:17 PM    ALTSGPT 21 07/14/2021 03:17 PM    TBILIRUBIN 0.5 07/14/2021 03:17 PM    ALBUMIN 4.7 07/14/2021 03:17 PM    TOTPROTEIN 7.7 07/14/2021 03:17 PM    GLOBULIN 3.0 07/14/2021 03:17 PM    AGRATIO 1.6 07/14/2021 03:17 PM       Lab Results   Component Value Date/Time    SODIUM 142 07/14/2021 1517    POTASSIUM 4.8 07/14/2021 1517    CHLORIDE 105 07/14/2021 1517    CO2 28 07/14/2021 1517    GLUCOSE 92 07/14/2021 1517    BUN 19 07/14/2021 1517    CREATININE 0.82 07/14/2021 1517    CALCIUM 9.7 07/14/2021 1517    ANION 9.0 07/14/2021 1517       Lab Results   Component Value Date/Time    CHOLSTRLTOT 185 02/12/2021 0941    TRIGLYCERIDE 94 02/12/2021 0941    HDL 64 02/12/2021 0941     (H) 02/12/2021 0941       Lab Results   Component Value Date/Time    TSHULTRASEN 1.310 03/02/2022 0833     Lab Results   Component Value Date/Time    FREET4 1.17 03/02/2022 0833     Lab Results   Component Value Date/Time    FREET3 2.65 02/12/2021 0941     No results found for: THYSTIMIG    Lab Results   Component Value Date/Time    MICROSOMALA 293.6 (H) 10/14/2015 0643         Imaging:      ASSESSMENT/PLAN:     1. Hypothyroidism, acquired, autoimmune  Controlled  Continue levothyroxine 75 MCG daily  Reviewed importance of adherence  Reviewed how to properly take levothyroxine  Repeat TSH levels in 6 months    2. Osteopenia of multiple sites  Unstable  No interval falls or fractures  Discussed that she is overdue for a bone density  Recommend that she get a DEXA at [facility] radiology  Orders were placed and I  will update her on the test results    3. Vitamin D deficiency  Unstable  I recommended she take vitamin D3 5000 IU daily  We will check calcium vitamin D with next labs    4. Postmenopausal  Patient is postmenopausal      Return in about 6 months (around 9/4/2022).      Thank you kindly for allowing me to participate in the thyroid care plan for this patient.    Toby Ruby MD, [facility], [facility]  03/04/22    CC:   Belen Trinidad P.A.-C.

## 2022-08-02 ENCOUNTER — OFFICE VISIT (OUTPATIENT)
Dept: MEDICAL GROUP | Facility: PHYSICIAN GROUP | Age: 62
End: 2022-08-02
Payer: COMMERCIAL

## 2022-08-02 VITALS
HEIGHT: 68 IN | WEIGHT: 152.2 LBS | TEMPERATURE: 99.4 F | RESPIRATION RATE: 16 BRPM | SYSTOLIC BLOOD PRESSURE: 110 MMHG | HEART RATE: 61 BPM | DIASTOLIC BLOOD PRESSURE: 70 MMHG | BODY MASS INDEX: 23.07 KG/M2 | OXYGEN SATURATION: 94 %

## 2022-08-02 DIAGNOSIS — E04.1 THYROID NODULE: ICD-10-CM

## 2022-08-02 DIAGNOSIS — Z00.00 WELL ADULT EXAM: ICD-10-CM

## 2022-08-02 DIAGNOSIS — E55.9 VITAMIN D DEFICIENCY: ICD-10-CM

## 2022-08-02 DIAGNOSIS — Z23 NEED FOR VACCINATION: ICD-10-CM

## 2022-08-02 DIAGNOSIS — Z12.11 COLON CANCER SCREENING: ICD-10-CM

## 2022-08-02 DIAGNOSIS — M19.90 ARTHRITIS: Chronic | ICD-10-CM

## 2022-08-02 DIAGNOSIS — E06.3 HYPOTHYROIDISM, ACQUIRED, AUTOIMMUNE: ICD-10-CM

## 2022-08-02 DIAGNOSIS — E78.2 MIXED HYPERLIPIDEMIA: Chronic | ICD-10-CM

## 2022-08-02 DIAGNOSIS — M15.9 PRIMARY OSTEOARTHRITIS INVOLVING MULTIPLE JOINTS: ICD-10-CM

## 2022-08-02 PROBLEM — R79.89 ABNORMAL CBC: Status: RESOLVED | Noted: 2021-07-14 | Resolved: 2022-08-02

## 2022-08-02 PROCEDURE — 90471 IMMUNIZATION ADMIN: CPT | Performed by: INTERNAL MEDICINE

## 2022-08-02 PROCEDURE — 99204 OFFICE O/P NEW MOD 45 MIN: CPT | Mod: 25 | Performed by: INTERNAL MEDICINE

## 2022-08-02 PROCEDURE — 90715 TDAP VACCINE 7 YRS/> IM: CPT | Performed by: INTERNAL MEDICINE

## 2022-08-02 RX ORDER — CELECOXIB 200 MG/1
200 CAPSULE ORAL
Qty: 90 CAPSULE | Refills: 0 | Status: SHIPPED | OUTPATIENT
Start: 2022-08-02 | End: 2022-12-28

## 2022-08-02 RX ORDER — LEVOTHYROXINE SODIUM 0.07 MG/1
75 TABLET ORAL
Qty: 90 TABLET | Refills: 3 | Status: SHIPPED | OUTPATIENT
Start: 2022-08-02 | End: 2023-04-11 | Stop reason: SDUPTHER

## 2022-08-02 ASSESSMENT — FIBROSIS 4 INDEX: FIB4 SCORE: 1.21

## 2022-08-02 NOTE — ASSESSMENT & PLAN NOTE
This is a chronic condition affecting different joints.  Patient takes Celebrex as needed.  She is requesting refill.

## 2022-08-02 NOTE — PROGRESS NOTES
"PRIMARY CARE CLINIC VISIT  Chief Complaint   Patient presents with   • Establish Care     Patient wants to find out if she needs to continue with some of her medications      Establish care  Discuss medical conditions  Prescription refills    History of Present Illness     Arthritis  This is a chronic condition affecting different joints.  Patient takes Celebrex as needed.  She is requesting refill.    Thyroid nodule  This was noted with previous ultrasound done in 2019.  The patient has not had any follow-up studies since that time.  I am ordering an ultrasound to be done    Hypothyroidism, acquired, autoimmune  Chronic condition.  The patient is taking levothyroxine.  Lab tests ordered for follow-up.    Vitamin D deficiency  Patient presently taking vitamin D supplementation.  Blood tests ordered.    Hyperlipidemia  Patient presently on diet therapy.  Lipid panel requested.      Current Outpatient Medications on File Prior to Visit   Medication Sig Dispense Refill   • Vitamin D, Cholecalciferol, 50 MCG (2000 UT) Cap Take  by mouth.     • Nutritional Supplements (ESTROVEN PO) Take  by mouth.     • Ascorbic Acid (VITAMIN C) 1000 MG Tab Take  by mouth.       No current facility-administered medications on file prior to visit.        Allergies: Patient has no known allergies.    ROS  As per HPI above. All other systems reviewed and negative.      Past Medical, Social, and Family history reviewed and updated in EPIC     Objective     /70 (BP Location: Left arm, Patient Position: Sitting, BP Cuff Size: Adult)   Pulse 61   Temp 37.4 °C (99.4 °F) (Temporal)   Resp 16   Ht 1.727 m (5' 8\")   Wt 69 kg (152 lb 3.2 oz)   SpO2 94%    Body mass index is 23.14 kg/m².    General: alert in no apparent distress.  Cardiovascular: regular rate and rhythm  Pulmonary: lungs : no wheezing   Gastrointestinal: BS present. No obvious mass noted          Assessment and Plan     1. Primary osteoarthritis involving multiple " joints  - celecoxib (CELEBREX) 200 MG Cap; Take 1 Capsule by mouth every day.  Dispense: 90 Capsule; Refill: 0    2. Hypothyroidism, acquired, autoimmune  - levothyroxine (SYNTHROID) 75 MCG Tab; Take 1 Tablet by mouth every morning on an empty stomach.  Dispense: 90 Tablet; Refill: 3  - TSH; Future  Lab tests ordered.  Advised the patient to continue with levothyroxine at this time.  3. Vitamin D deficiency  Blood test requested.  4. Thyroid nodule  - US-THYROID; Future  Recommend follow-up after ultrasound done.  5. Mixed hyperlipidemia  Lipid panel requested.  6. Colon cancer screening  - Referral to GI for Colonoscopy    7. Well adult exam  - Basic Metabolic Panel; Future  - Lipid Profile; Future  - VITAMIN D,25 HYDROXY; Future  - CBC WITHOUT DIFFERENTIAL; Future  - VITAMIN B12; Future  Routine lab work ordered.  Advised the patient to follow-up after lab test done  8. Need for vaccination  - Tdap Vaccine =>8YO IM                      Healthcare Maintenance     Health Maintenance Due   Topic Date Due   • IMM DTaP/Tdap/Td Vaccine (1 - Tdap) Never done   • IMM ZOSTER VACCINES (1 of 2) Never done   • MAMMOGRAM  07/02/2015   • PAP SMEAR  07/02/2017   • COVID-19 Vaccine (3 - Booster for Moderna series) 09/16/2021       Recommend patient to go to local pharmacy for shingle vaccine and COVID booster vaccine.  Strongly recommend mammogram and Pap smear to be done.  The patient refused.       Please note that this dictation was created using voice recognition software. I have made every reasonable attempt to correct obvious errors, but I expect that there are errors of grammar and possibly content that I did not discover before finalizing the note.    Facundo Mattson MD  Internal Medicine  [facility]

## 2022-08-02 NOTE — ASSESSMENT & PLAN NOTE
This was noted with previous ultrasound done in 2019.  The patient has not had any follow-up studies since that time.  I am ordering an ultrasound to be done

## 2022-08-04 ENCOUNTER — HOSPITAL ENCOUNTER (OUTPATIENT)
Dept: LAB | Facility: MEDICAL CENTER | Age: 62
End: 2022-08-04
Attending: INTERNAL MEDICINE
Payer: COMMERCIAL

## 2022-08-04 DIAGNOSIS — E06.3 HYPOTHYROIDISM, ACQUIRED, AUTOIMMUNE: ICD-10-CM

## 2022-08-04 DIAGNOSIS — Z00.00 WELL ADULT EXAM: ICD-10-CM

## 2022-08-04 LAB
25(OH)D3 SERPL-MCNC: 45 NG/ML (ref 30–100)
ANION GAP SERPL CALC-SCNC: 10 MMOL/L (ref 7–16)
BUN SERPL-MCNC: 17 MG/DL (ref 8–22)
CALCIUM SERPL-MCNC: 9.6 MG/DL (ref 8.5–10.5)
CHLORIDE SERPL-SCNC: 104 MMOL/L (ref 96–112)
CHOLEST SERPL-MCNC: 337 MG/DL (ref 100–199)
CO2 SERPL-SCNC: 27 MMOL/L (ref 20–33)
CREAT SERPL-MCNC: 0.81 MG/DL (ref 0.5–1.4)
ERYTHROCYTE [DISTWIDTH] IN BLOOD BY AUTOMATED COUNT: 38.5 FL (ref 35.9–50)
FASTING STATUS PATIENT QL REPORTED: NORMAL
GFR SERPLBLD CREATININE-BSD FMLA CKD-EPI: 81 ML/MIN/1.73 M 2
GLUCOSE SERPL-MCNC: 82 MG/DL (ref 65–99)
HCT VFR BLD AUTO: 43.8 % (ref 37–47)
HDLC SERPL-MCNC: 61 MG/DL
HGB BLD-MCNC: 14.7 G/DL (ref 12–16)
LDLC SERPL CALC-MCNC: 248 MG/DL
MCH RBC QN AUTO: 28.8 PG (ref 27–33)
MCHC RBC AUTO-ENTMCNC: 33.6 G/DL (ref 33.6–35)
MCV RBC AUTO: 85.9 FL (ref 81.4–97.8)
PLATELET # BLD AUTO: 244 K/UL (ref 164–446)
PMV BLD AUTO: 9.8 FL (ref 9–12.9)
POTASSIUM SERPL-SCNC: 4.7 MMOL/L (ref 3.6–5.5)
RBC # BLD AUTO: 5.1 M/UL (ref 4.2–5.4)
SODIUM SERPL-SCNC: 141 MMOL/L (ref 135–145)
TRIGL SERPL-MCNC: 139 MG/DL (ref 0–149)
TSH SERPL DL<=0.005 MIU/L-ACNC: 1.51 UIU/ML (ref 0.38–5.33)
VIT B12 SERPL-MCNC: 598 PG/ML (ref 211–911)
WBC # BLD AUTO: 4.5 K/UL (ref 4.8–10.8)

## 2022-08-04 PROCEDURE — 82607 VITAMIN B-12: CPT

## 2022-08-04 PROCEDURE — 85027 COMPLETE CBC AUTOMATED: CPT

## 2022-08-04 PROCEDURE — 80048 BASIC METABOLIC PNL TOTAL CA: CPT

## 2022-08-04 PROCEDURE — 84443 ASSAY THYROID STIM HORMONE: CPT

## 2022-08-04 PROCEDURE — 82306 VITAMIN D 25 HYDROXY: CPT

## 2022-08-04 PROCEDURE — 36415 COLL VENOUS BLD VENIPUNCTURE: CPT

## 2022-08-04 PROCEDURE — 80061 LIPID PANEL: CPT

## 2022-08-27 ENCOUNTER — HOSPITAL ENCOUNTER (OUTPATIENT)
Dept: RADIOLOGY | Facility: MEDICAL CENTER | Age: 62
End: 2022-08-27
Attending: INTERNAL MEDICINE
Payer: COMMERCIAL

## 2022-08-27 DIAGNOSIS — E04.1 THYROID NODULE: ICD-10-CM

## 2022-08-27 PROCEDURE — 76536 US EXAM OF HEAD AND NECK: CPT

## 2022-08-29 DIAGNOSIS — E04.1 THYROID NODULE: ICD-10-CM

## 2022-08-30 ENCOUNTER — TELEPHONE (OUTPATIENT)
Dept: MEDICAL GROUP | Facility: PHYSICIAN GROUP | Age: 62
End: 2022-08-30
Payer: COMMERCIAL

## 2022-08-30 DIAGNOSIS — E78.5 HYPERLIPIDEMIA, UNSPECIFIED HYPERLIPIDEMIA TYPE: ICD-10-CM

## 2022-08-30 RX ORDER — ATORVASTATIN CALCIUM 20 MG/1
20 TABLET, FILM COATED ORAL DAILY
Qty: 90 TABLET | Refills: 3 | Status: SHIPPED | OUTPATIENT
Start: 2022-08-30

## 2022-08-30 NOTE — TELEPHONE ENCOUNTER
Caller Name: Vivi Sommer    Call Back Number: [phone] (home)       How would the patient prefer to be contacted with a response: Phone call do NOT leave a detailed message    Patient declined to schedule visit per your last note on labs. She states she is leaving the country and would prefer to go back to [place] before she leaves to visit family. I advised the patient that I would double check to make sure this is okay but the doctor did want to follow up to discuss treatment options in person. Please advise.

## 2022-09-08 ENCOUNTER — OFFICE VISIT (OUTPATIENT)
Dept: ENDOCRINOLOGY | Facility: MEDICAL CENTER | Age: 62
End: 2022-09-08
Attending: INTERNAL MEDICINE
Payer: COMMERCIAL

## 2022-09-08 VITALS
BODY MASS INDEX: 22.73 KG/M2 | WEIGHT: 150 LBS | DIASTOLIC BLOOD PRESSURE: 58 MMHG | HEART RATE: 75 BPM | HEIGHT: 68 IN | OXYGEN SATURATION: 98 % | SYSTOLIC BLOOD PRESSURE: 102 MMHG

## 2022-09-08 DIAGNOSIS — E78.5 DYSLIPIDEMIA: ICD-10-CM

## 2022-09-08 DIAGNOSIS — E04.1 LEFT THYROID NODULE: ICD-10-CM

## 2022-09-08 DIAGNOSIS — E55.9 VITAMIN D DEFICIENCY: ICD-10-CM

## 2022-09-08 DIAGNOSIS — M85.89 OSTEOPENIA OF MULTIPLE SITES: ICD-10-CM

## 2022-09-08 DIAGNOSIS — E06.3 HYPOTHYROIDISM, ACQUIRED, AUTOIMMUNE: Chronic | ICD-10-CM

## 2022-09-08 PROCEDURE — 99214 OFFICE O/P EST MOD 30 MIN: CPT | Performed by: INTERNAL MEDICINE

## 2022-09-08 PROCEDURE — 99211 OFF/OP EST MAY X REQ PHY/QHP: CPT | Performed by: INTERNAL MEDICINE

## 2022-09-08 ASSESSMENT — FIBROSIS 4 INDEX: FIB4 SCORE: 1.26

## 2022-09-08 NOTE — PROGRESS NOTES
Chief Complaint: Follow up for Primary Hypothyroidism secondary to Hashimoto's thyroiditis, left lower lobe exophytic thyroid nodule versus parathyroid adenoma, osteopenia    HPI:     Vivi Sommer is a 62 y.o. female here for follow up of the above medical issues. She was previously a patient of Dr. Jones    She has Hashimoto's thyroiditis. TPO antibodies at baseline were over 200.  She denies a family history of thyroid cancer. She has a history of multiple thyroid nodules with dominant hypoechoic solid nodule measuring 1.2 cm on the left lower lobe which was previously biopsied in 2015 with nondiagnostic findings. She has declined repeat biopsy.    She had a repeat ultrasound on August 2022 and I personally reviewed the images  The 1.2 cm nodule on the left lower lobe is stable but the images show that this nodule is actually extrathyroidal and I believe that this actually represents a parathyroid or another differential is a lymph node or this could be exophytic thyroid nodule        She also has osteopenia based on last bone density from October 7, 2014 showing the lowest T score of -2.1 for the left femoral neck with FRAX scores that are not significantly elevated. Her 10-year risk for any major osteoporotic fracture was 9.3%. Her 10-year risk for any major hip fracture was 1.7%    She is now taking vitamin D  She is also taking calcium supplements  She denies interval falls and fractures  She was supposed to repeat a bone density and the orders are in place but she was unable to complete the bone density because of family reasons        Since last visit patient reports feeling excellent.  She remains on Levothyroxine 75 MCG daily which has been her dose for the past 12  months.   She reports excellent compliance and denies missing any daily doses.   She takes thyroid hormone prior to breakfast.   She  denies taking any iron, calcium supplements or antacids.      Weight has been stable    She  currently denies denies fatigue, weight changes, heat/cold intolerance, bowel/skin changes or CVS symptoms.           Her most recent TSH is normal 1.5 on August 4, 2022    Her total cholesterol was 337 with an LDL of 248 on August 4, 2022  She admits that she stopped taking her atorvastatin      Her vitamin D is 45  Calcium was normal 9.6  Ionized calcium and PTH levels are not available        Patient's medications, allergies, and social histories were reviewed and updated as appropriate.      ROS:     CONS:     No fever, no chills   EYES:     No diplopia, no blurry vision   CV:           No chest pain, no palpitations   PULM:     No SOB, no cough, no hemoptysis.   GI:            No nausea, no vomiting, no diarrhea, no constipation   ENDO:     No polyuria, no polydipsia, no heat intolerance, no cold intolerance       Past Medical History:  Problem List:  2021-07: Kidney problem  2021-07: Abnormal CBC  2021-02: Arthritis  2021-02: Vision loss  2018-12: Vitamin D deficiency  2018-12: Left foot pain  2018-04: Renal mass  2018-04: Ganglion cyst  2018-04: Encounter to establish care with new doctor  2018-04: Thyroid nodule  2016-03: Hypothyroidism, acquired, autoimmune  2015-08: Hypothyroidism  2015-08: Left thyroid nodule  2015-08: Nasal sinus cyst  2015-08: Bilateral shoulder pain  2015-07: Fatigue  2015-07: Family history of heart disease in female family member   before age 65  2014-10: Dyslipidemia  2014-10: S/P laminectomy  2014-10: Osteopenia    Past Surgical History:  Past Surgical History:   Procedure Laterality Date    OTHER ORTHOPEDIC SURGERY      L2-3 discectomy        Allergies:  Patient has no known allergies.     Social History:  Social History     Tobacco Use    Smoking status: Never    Smokeless tobacco: Never   Vaping Use    Vaping Use: Never used   Substance Use Topics    Alcohol use: No     Alcohol/week: 0.0 oz    Drug use: No        Family History:   family history includes Heart Disease in her  "mother; Other in her father.      PHYSICAL EXAM:   Vital signs: /58 (BP Location: Left arm, Patient Position: Sitting, BP Cuff Size: Adult)   Pulse 75   Ht 1.727 m (5' 8\")   Wt 68 kg (150 lb)   SpO2 98%   BMI 22.81 kg/m²   GENERAL: Well-developed, well-nourished in no apparent distress.   EYE:  No ocular asymmetry, PERRLA  HENT: Pink, moist mucous membranes.    NECK: No thyromegaly.   CARDIOVASCULAR:  No murmurs  LUNGS: Clear breath sounds  ABDOMEN: Soft, nontender   EXTREMITIES: No clubbing, cyanosis, or edema.   NEUROLOGICAL: No gross focal motor abnormalities   LYMPH: No cervical adenopathy palpated.   SKIN: No rashes, lesions.       Labs:  Lab Results   Component Value Date/Time    SODIUM 141 08/04/2022 08:41 AM    POTASSIUM 4.7 08/04/2022 08:41 AM    CHLORIDE 104 08/04/2022 08:41 AM    CO2 27 08/04/2022 08:41 AM    ANION 10.0 08/04/2022 08:41 AM    GLUCOSE 82 08/04/2022 08:41 AM    BUN 17 08/04/2022 08:41 AM    CREATININE 0.81 08/04/2022 08:41 AM    CALCIUM 9.6 08/04/2022 08:41 AM    ASTSGOT 22 07/14/2021 03:17 PM    ALTSGPT 21 07/14/2021 03:17 PM    TBILIRUBIN 0.5 07/14/2021 03:17 PM    ALBUMIN 4.7 07/14/2021 03:17 PM    TOTPROTEIN 7.7 07/14/2021 03:17 PM    GLOBULIN 3.0 07/14/2021 03:17 PM    AGRATIO 1.6 07/14/2021 03:17 PM       Lab Results   Component Value Date/Time    SODIUM 142 07/14/2021 1517    POTASSIUM 4.8 07/14/2021 1517    CHLORIDE 105 07/14/2021 1517    CO2 28 07/14/2021 1517    GLUCOSE 92 07/14/2021 1517    BUN 19 07/14/2021 1517    CREATININE 0.82 07/14/2021 1517    CALCIUM 9.7 07/14/2021 1517    ANION 9.0 07/14/2021 1517       Lab Results   Component Value Date/Time    CHOLSTRLTOT 185 02/12/2021 0941    TRIGLYCERIDE 94 02/12/2021 0941    HDL 64 02/12/2021 0941     (H) 02/12/2021 0941       Lab Results   Component Value Date/Time    TSHULTRASEN 1.310 03/02/2022 0833     Lab Results   Component Value Date/Time    FREET4 1.17 03/02/2022 0833     Lab Results   Component Value " Date/Time    FREET3 2.65 02/12/2021 0941     No results found for: THYSTIMIG    Lab Results   Component Value Date/Time    MICROSOMALA 293.6 (H) 10/14/2015 0643         Imaging:      ASSESSMENT/PLAN:     1. Hypothyroidism, acquired, autoimmune  Stable   continue levothyroxine 75 MCG daily  Follow-up in 6 months with repeat thyroid labs    2. Osteopenia of multiple sites  Stable  No interval falls or fractures  Patient was unable to complete bone density because of family reasons  She is going to [place] and she promises that she will complete the bone density after she arrives home  Recommend repeating bone density this year as she is overdue    3. Vitamin D deficiency  Stable  Vitamin D levels are improved  Continue monitoring    4. Left thyroid nodule  Stable  Upon review of images the left thyroid nodule discussed by radiology appears to be extrathyroidal  And I believe that this represents either a lymph node or parathyroid more likely a parathyroid  I recommend observation  I am going to start monitoring her calcium ionized calcium and PTH levels    5. Dyslipidemia  Uncontrolled  Her total cholesterol and LDL cholesterols elevated because she stopped taking her atorvastatin  I recommend that she restart atorvastatin  Repeat fasting lipids next year      Return in about 6 months (around 3/8/2023).      Thank you kindly for allowing me to participate in the thyroid care plan for this patient.    Toby Ruby MD, [facility], [facility]  03/04/22    CC:   Belen Trinidad P.A.-C.

## 2022-12-21 ENCOUNTER — OFFICE VISIT (OUTPATIENT)
Dept: URGENT CARE | Facility: PHYSICIAN GROUP | Age: 62
End: 2022-12-21
Payer: COMMERCIAL

## 2022-12-21 VITALS
RESPIRATION RATE: 12 BRPM | HEART RATE: 88 BPM | BODY MASS INDEX: 21.98 KG/M2 | HEIGHT: 68 IN | TEMPERATURE: 97.7 F | DIASTOLIC BLOOD PRESSURE: 82 MMHG | OXYGEN SATURATION: 97 % | SYSTOLIC BLOOD PRESSURE: 128 MMHG | WEIGHT: 145 LBS

## 2022-12-21 DIAGNOSIS — N30.01 ACUTE CYSTITIS WITH HEMATURIA: ICD-10-CM

## 2022-12-21 DIAGNOSIS — Z87.448 HISTORY OF PROLAPSE OF BLADDER: ICD-10-CM

## 2022-12-21 LAB
APPEARANCE UR: NORMAL
BILIRUB UR STRIP-MCNC: NEGATIVE MG/DL
COLOR UR AUTO: NORMAL
GLUCOSE UR STRIP.AUTO-MCNC: NEGATIVE MG/DL
KETONES UR STRIP.AUTO-MCNC: NEGATIVE MG/DL
LEUKOCYTE ESTERASE UR QL STRIP.AUTO: NORMAL
NITRITE UR QL STRIP.AUTO: NEGATIVE
PH UR STRIP.AUTO: 5.5 [PH] (ref 5–8)
PROT UR QL STRIP: NEGATIVE MG/DL
RBC UR QL AUTO: NORMAL
SP GR UR STRIP.AUTO: <=1.005
UROBILINOGEN UR STRIP-MCNC: NORMAL MG/DL

## 2022-12-21 PROCEDURE — 99213 OFFICE O/P EST LOW 20 MIN: CPT | Performed by: STUDENT IN AN ORGANIZED HEALTH CARE EDUCATION/TRAINING PROGRAM

## 2022-12-21 PROCEDURE — 81002 URINALYSIS NONAUTO W/O SCOPE: CPT | Performed by: STUDENT IN AN ORGANIZED HEALTH CARE EDUCATION/TRAINING PROGRAM

## 2022-12-21 RX ORDER — CIPROFLOXACIN 500 MG/1
500 TABLET, FILM COATED ORAL 2 TIMES DAILY
COMMUNITY
End: 2023-04-11

## 2022-12-21 RX ORDER — SULFAMETHOXAZOLE AND TRIMETHOPRIM 800; 160 MG/1; MG/1
1 TABLET ORAL EVERY 12 HOURS
Qty: 10 TABLET | Refills: 0 | Status: SHIPPED | OUTPATIENT
Start: 2022-12-21 | End: 2022-12-26

## 2022-12-21 ASSESSMENT — ENCOUNTER SYMPTOMS
DIARRHEA: 0
NAUSEA: 0
CHILLS: 0
SWEATS: 0
FLANK PAIN: 0
ABDOMINAL PAIN: 0
VOMITING: 0
FEVER: 0
CONSTIPATION: 0

## 2022-12-21 ASSESSMENT — FIBROSIS 4 INDEX: FIB4 SCORE: 1.26

## 2022-12-21 NOTE — PROGRESS NOTES
"Subjective     Vivi Sommer is a 62 y.o. female who presents with Dysuria (Cloudy urine x10 days, painful urination. Taking ciproflaxin which helped but not fully better. )            Vivi is a 62 y.o. female presents today with symptoms of painful urination and cloudy urine for 10 days.  Patient states that she took ciprofloxacin which was prescribed by her primary care provider in the [facility].  Patient states she had no improvement of symptoms after taking Cipro.  Patient also reports history of bladder prolapse and when she is requesting referral for further evaluation/management prolapse.    Dysuria   This is a new problem. Episode onset: 10 days go. The quality of the pain is described as burning. The pain is mild. There has been no fever. She is Not sexually active. There is No history of pyelonephritis. Associated symptoms include frequency, hematuria and urgency. Pertinent negatives include no chills, discharge, flank pain, hesitancy, nausea, possible pregnancy, sweats or vomiting.     Review of Systems   Constitutional:  Negative for chills, fever and malaise/fatigue.   Gastrointestinal:  Negative for abdominal pain, constipation, diarrhea, nausea and vomiting.   Genitourinary:  Positive for dysuria, frequency, hematuria and urgency. Negative for flank pain and hesitancy.   All other systems reviewed and are negative.           Objective     /82   Pulse 88   Temp 36.5 °C (97.7 °F)   Resp 12   Ht 1.727 m (5' 8\")   Wt 65.8 kg (145 lb)   SpO2 97%   BMI 22.05 kg/m²      Physical Exam  Vitals reviewed.   Constitutional:       General: She is not in acute distress.     Appearance: Normal appearance. She is not ill-appearing or toxic-appearing.   HENT:      Head: Normocephalic and atraumatic.   Cardiovascular:      Rate and Rhythm: Normal rate.   Pulmonary:      Effort: Pulmonary effort is normal.   Abdominal:      General: Abdomen is flat. There is no distension.      Tenderness: There is " no abdominal tenderness. There is no right CVA tenderness, left CVA tenderness, guarding or rebound.   Skin:     General: Skin is warm and dry.   Neurological:      General: No focal deficit present.      Mental Status: She is alert. Mental status is at baseline.                           Assessment & Plan        1. Acute cystitis with hematuria  - POCT Urinalysis  - URINE CULTURE(NEW); Future  - sulfamethoxazole-trimethoprim (BACTRIM DS) 800-160 MG tablet; Take 1 Tablet by mouth every 12 hours for 5 days.  Dispense: 10 Tablet; Refill: 0    2. History of prolapse of bladder  - Referral to OB/Gyn     Differential diagnoses, supportive care, and indications for immediate follow-up discussed with patient. Pathogenesis of diagnosis discussed including typical length and natural progression.  See AVS.    Instructed to return to urgent care or nearest emergency department if symptoms fail to improve, for any change in condition, further concerns, or new concerning symptoms.    Patient states understanding and agrees with the plan of care and discharge instructions.

## 2022-12-26 DIAGNOSIS — M15.9 PRIMARY OSTEOARTHRITIS INVOLVING MULTIPLE JOINTS: ICD-10-CM

## 2022-12-28 RX ORDER — CELECOXIB 200 MG/1
200 CAPSULE ORAL
Qty: 90 CAPSULE | Refills: 0 | Status: SHIPPED | OUTPATIENT
Start: 2022-12-28 | End: 2023-04-11

## 2023-02-15 ENCOUNTER — OFFICE VISIT (OUTPATIENT)
Dept: MEDICAL GROUP | Facility: PHYSICIAN GROUP | Age: 63
End: 2023-02-15
Payer: COMMERCIAL

## 2023-02-15 ENCOUNTER — HOSPITAL ENCOUNTER (OUTPATIENT)
Dept: RADIOLOGY | Facility: MEDICAL CENTER | Age: 63
End: 2023-02-15
Attending: INTERNAL MEDICINE
Payer: COMMERCIAL

## 2023-02-15 ENCOUNTER — HOSPITAL ENCOUNTER (OUTPATIENT)
Dept: LAB | Facility: MEDICAL CENTER | Age: 63
End: 2023-02-15
Attending: INTERNAL MEDICINE
Payer: COMMERCIAL

## 2023-02-15 VITALS
WEIGHT: 150 LBS | TEMPERATURE: 98.2 F | OXYGEN SATURATION: 99 % | RESPIRATION RATE: 16 BRPM | DIASTOLIC BLOOD PRESSURE: 72 MMHG | BODY MASS INDEX: 22.73 KG/M2 | SYSTOLIC BLOOD PRESSURE: 118 MMHG | HEIGHT: 68 IN | HEART RATE: 54 BPM

## 2023-02-15 DIAGNOSIS — E06.3 HYPOTHYROIDISM, ACQUIRED, AUTOIMMUNE: Chronic | ICD-10-CM

## 2023-02-15 DIAGNOSIS — M79.642 LEFT HAND PAIN: ICD-10-CM

## 2023-02-15 DIAGNOSIS — K63.5 POLYP OF COLON, UNSPECIFIED PART OF COLON, UNSPECIFIED TYPE: ICD-10-CM

## 2023-02-15 DIAGNOSIS — Z01.419 WELL WOMAN EXAM: ICD-10-CM

## 2023-02-15 DIAGNOSIS — E55.9 VITAMIN D DEFICIENCY: Chronic | ICD-10-CM

## 2023-02-15 DIAGNOSIS — E78.5 DYSLIPIDEMIA: ICD-10-CM

## 2023-02-15 DIAGNOSIS — N81.9 FEMALE GENITAL PROLAPSE, UNSPECIFIED TYPE: ICD-10-CM

## 2023-02-15 DIAGNOSIS — M25.532 LEFT WRIST PAIN: ICD-10-CM

## 2023-02-15 DIAGNOSIS — E04.1 LEFT THYROID NODULE: Chronic | ICD-10-CM

## 2023-02-15 DIAGNOSIS — Z12.31 ENCOUNTER FOR SCREENING MAMMOGRAM FOR MALIGNANT NEOPLASM OF BREAST: ICD-10-CM

## 2023-02-15 PROCEDURE — 99215 OFFICE O/P EST HI 40 MIN: CPT | Performed by: INTERNAL MEDICINE

## 2023-02-15 PROCEDURE — 73110 X-RAY EXAM OF WRIST: CPT | Mod: LT

## 2023-02-15 PROCEDURE — 80048 BASIC METABOLIC PNL TOTAL CA: CPT

## 2023-02-15 PROCEDURE — 84443 ASSAY THYROID STIM HORMONE: CPT

## 2023-02-15 PROCEDURE — 36415 COLL VENOUS BLD VENIPUNCTURE: CPT

## 2023-02-15 PROCEDURE — 82306 VITAMIN D 25 HYDROXY: CPT

## 2023-02-15 PROCEDURE — 73130 X-RAY EXAM OF HAND: CPT | Mod: LT

## 2023-02-15 PROCEDURE — 86431 RHEUMATOID FACTOR QUANT: CPT

## 2023-02-15 PROCEDURE — 84460 ALANINE AMINO (ALT) (SGPT): CPT

## 2023-02-15 PROCEDURE — 80061 LIPID PANEL: CPT

## 2023-02-15 ASSESSMENT — FIBROSIS 4 INDEX: FIB4 SCORE: 1.26

## 2023-02-15 ASSESSMENT — PATIENT HEALTH QUESTIONNAIRE - PHQ9: CLINICAL INTERPRETATION OF PHQ2 SCORE: 0

## 2023-02-15 NOTE — ASSESSMENT & PLAN NOTE
Chronic condition.  The patient is taking levothyroxine 75 mcg daily.  Patient is due for lab test.  No significant side effects reported.

## 2023-02-15 NOTE — ASSESSMENT & PLAN NOTE
This is a chronic condition.  The patient had colonoscopy done in 2015 showed polyps.  Patient denies fever chills abdominal pain or change in bowel movement.  I recommended referral to follow-up with GI specialist to consider repeat colonoscopy.

## 2023-02-15 NOTE — ASSESSMENT & PLAN NOTE
This is a chronic condition.  Patient stated that her former gynecologist has retired.  The patient requested referral to see a different gynecologist.

## 2023-02-15 NOTE — ASSESSMENT & PLAN NOTE
This is a new condition noticed since the last several months.  Patient reported pain in her left hand specifically at the base of the left thumb extending to the left wrist.  Patient denies trauma or injury.  Patient is right-hand predominant.  She denies fever or chills.  Denies recent trauma or injury.

## 2023-02-15 NOTE — ASSESSMENT & PLAN NOTE
Chronic ongoing condition.  The patient is taking atorvastatin 20 Mg daily.  Patient tolerating medication well.  No significant side effects reported.  Lab test requested for follow-up.

## 2023-02-15 NOTE — PROGRESS NOTES
PRIMARY CARE CLINIC VISIT    Chief complaint:  Left hand/wrist pain  Colon polyp  Follow-up hyperlipidemia  Female genital prolapse request gynecology referral  Hypothyroidism  Left thyroid nodule follow-up  Vitamin D deficiency    History of Present Illness     Colon polyp  This is a chronic condition.  The patient had colonoscopy done in 2015 showed polyps.  Patient denies fever chills abdominal pain or change in bowel movement.  I recommended referral to follow-up with GI specialist to consider repeat colonoscopy.    Dyslipidemia  Chronic ongoing condition.  The patient is taking atorvastatin 20 Mg daily.  Patient tolerating medication well.  No significant side effects reported.  Lab test requested for follow-up.    Female genital prolapse, unspecified  This is a chronic condition.  Patient stated that her former gynecologist has retired.  The patient requested referral to see a different gynecologist.    Hypothyroidism, acquired, autoimmune  Chronic condition.  The patient is taking levothyroxine 75 mcg daily.  Patient is due for lab test.  No significant side effects reported.    Left thyroid nodule  Chronic condition.  Followed by endocrinology service.  The patient had not had thyroid ultrasound test since 2019.  Recommend to repeat the study.    Vitamin D deficiency  Chronic condition.  The patient takes vitamin D supplementation.  Lab test ordered for follow-up.    Left hand pain  This is a new condition noticed since the last several months.  Patient reported pain in her left hand specifically at the base of the left thumb extending to the left wrist.  Patient denies trauma or injury.  Patient is right-hand predominant.  She denies fever or chills.  Denies recent trauma or injury.    Current Outpatient Medications on File Prior to Visit   Medication Sig Dispense Refill    celecoxib (CELEBREX) 200 MG Cap TAKE 1 CAPSULE BY MOUTH EVERY DAY (Patient not taking: Reported on 2/15/2023) 90 Capsule 0     atorvastatin (LIPITOR) 20 MG Tab Take 1 Tablet by mouth every day. 90 Tablet 3    levothyroxine (SYNTHROID) 75 MCG Tab Take 1 Tablet by mouth every morning on an empty stomach. 90 Tablet 3    Vitamin D, Cholecalciferol, 50 MCG (2000 UT) Cap Take  by mouth.      Nutritional Supplements (ESTROVEN PO) Take  by mouth.      ciprofloxacin (CIPRO) 500 MG Tab Take 500 mg by mouth 2 times a day. (Patient not taking: Reported on 2/15/2023)      Ascorbic Acid (VITAMIN C) 1000 MG Tab Take  by mouth.       No current facility-administered medications on file prior to visit.        Allergies: Patient has no known allergies.    Current Outpatient Medications Ordered in Epic   Medication Sig Dispense Refill    celecoxib (CELEBREX) 200 MG Cap TAKE 1 CAPSULE BY MOUTH EVERY DAY (Patient not taking: Reported on 2/15/2023) 90 Capsule 0    atorvastatin (LIPITOR) 20 MG Tab Take 1 Tablet by mouth every day. 90 Tablet 3    levothyroxine (SYNTHROID) 75 MCG Tab Take 1 Tablet by mouth every morning on an empty stomach. 90 Tablet 3    Vitamin D, Cholecalciferol, 50 MCG (2000 UT) Cap Take  by mouth.      Nutritional Supplements (ESTROVEN PO) Take  by mouth.      ciprofloxacin (CIPRO) 500 MG Tab Take 500 mg by mouth 2 times a day. (Patient not taking: Reported on 2/15/2023)      Ascorbic Acid (VITAMIN C) 1000 MG Tab Take  by mouth.       No current Epic-ordered facility-administered medications on file.       Past Medical History:   Diagnosis Date    Chronic autoimmune thyroiditis      + US / + TPO =293    Hyperlipidemia 10/21/2014    Hypothyroidism     Multiple thyroid nodules        Past Surgical History:   Procedure Laterality Date    OTHER ORTHOPEDIC SURGERY      L2-3 discectomy       Family History   Problem Relation Age of Onset    Heart Disease Mother     Other Father         cirrhosis    Diabetes Neg Hx     Stroke Neg Hx        Social History     Tobacco Use   Smoking Status Never   Smokeless Tobacco Never       Social History  "    Substance and Sexual Activity   Alcohol Use No    Alcohol/week: 0.0 oz       Review of systems.  As per HPI above. All other systems reviewed and negative.      Past Medical, Social, and Family history reviewed and updated in EPIC     Objective     /72 (BP Location: Right arm, Patient Position: Sitting, BP Cuff Size: Adult)   Pulse (!) 54   Temp 36.8 °C (98.2 °F) (Temporal)   Resp 16   Ht 1.727 m (5' 8\")   Wt 68 kg (150 lb)   SpO2 99%    Body mass index is 22.81 kg/m².    General: alert in no apparent distress.  Cardiovascular: regular rate and rhythm  Pulmonary: lungs : no wheezing   Gastrointestinal: BS present. No obvious mass noted  Left hand/wrist no significant swelling redness or deformity.  Range of motion of the wrist is limited due to pain especially with wrist flexion and extension.  Mild pain noted in the anatomical snuffbox base of left thumb.  Handgrip difficult to evaluate due to pain          Lab Results   Component Value Date/Time    WBC 4.5 (L) 08/04/2022 08:41 AM    HEMOGLOBIN 14.7 08/04/2022 08:41 AM    HEMATOCRIT 43.8 08/04/2022 08:41 AM    MCV 85.9 08/04/2022 08:41 AM    PLATELETCT 244 08/04/2022 08:41 AM         Lab Results   Component Value Date/Time    SODIUM 141 08/04/2022 08:41 AM    POTASSIUM 4.7 08/04/2022 08:41 AM    GLUCOSE 82 08/04/2022 08:41 AM    BUN 17 08/04/2022 08:41 AM    CREATININE 0.81 08/04/2022 08:41 AM       Lab Results   Component Value Date/Time    CHOLSTRLTOT 337 (H) 08/04/2022 08:41 AM     (H) 08/04/2022 08:41 AM    HDL 61 08/04/2022 08:41 AM    TRIGLYCERIDE 139 08/04/2022 08:41 AM       Lab Results   Component Value Date/Time    ALTSGPT 21 07/14/2021 03:17 PM             Assessment and Plan     1. Left hand pain  New condition.  Exam today is suggestive of osteoarthritis/degenerative joint.  Rule out other causes.    - DX-HAND 3+ LEFT; Future  - Referral to Hand Surgery  - RHEUMATOID ARTHRITIS FACTOR; Future  Recommended the patient to try " over-the-counter Voltaren gel.  She also has prescription for Celebrex to take as needed for pain    2. Left wrist pain  This is a new condition.  Likely due to wrist tendinitis.  Use Voltaren gel over-the-counter as needed for pain control.  - DX-WRIST-COMPLETE 3+ LEFT; Future  - Referral to Hand Surgery    3. Hypothyroidism, acquired, autoimmune  This is a chronic condition.  Control is unclear.  Lab tests ordered.  Advised the patient to continue to take levothyroxine.  Continue follow-up with endocrinologist.  - TSH; Future    4. Vitamin D deficiency  Chronic condition.  Status unclear.  Lab tests ordered for follow-up.  - VITAMIN D,25 HYDROXY (DEFICIENCY); Future    5. Dyslipidemia  Chronic condition.  Current control is unclear.  Lab tests requested.  Continue a atorvastatin 20 Mg daily.  Continue with low-fat low-cholesterol diet.  - ALANINE AMINO-TRANS; Future  - Lipid Profile; Future  - Basic Metabolic Panel; Future    6. Left thyroid nodule  Condition.  Current status unclear.  Last ultrasound was done 2019.  I have requested a follow-up ultrasound to be done.  Advised the patient to follow-up with endocrinologist Dr. Ruby  - US-THYROID; Future    7. Polyp of colon, unspecified part of colon, unspecified type  Chronic condition.  The patient is due for repeat colonoscopy.  Referral submitted today.  - Referral to GI for Colonoscopy    8. Encounter for screening mammogram for malignant neoplasm of breast  - MA-SCREENING MAMMO BILAT W/TOMOSYNTHESIS W/CAD; Future    9. Female genital prolapse, unspecified type  Chronic condition.  Uncontrolled.  Patient declining examination today by this provider  - Referral to Gynecology        Total time:  42   min -  That includes time for chart review before the visit, the actual patient visit, and time spent on documentation in EMR after the visit.  Chart review/prep, review of other providers' records, imaging/lab review, face-to-face time for history/examination,  ordering, prescribing,  review of results/meds/ treatment plan with patient, and care coordination.             Healthcare Maintenance         Recommend patient to go to local pharmacy for shingles vaccine and COVID booster vaccine.  Patient declined influenza vaccine          Please note that this dictation was created using voice recognition software. I have made every reasonable attempt to correct obvious errors, but I expect that there are errors of grammar and possibly content that I did not discover before finalizing the note.    Facundo Mattson MD  Internal Medicine  [facility]

## 2023-02-15 NOTE — ASSESSMENT & PLAN NOTE
Chronic condition.  Followed by endocrinology service.  The patient had not had thyroid ultrasound test since 2019.  Recommend to repeat the study.

## 2023-02-16 LAB
25(OH)D3 SERPL-MCNC: 53 NG/ML (ref 30–100)
ALT SERPL-CCNC: 18 U/L (ref 2–50)
ANION GAP SERPL CALC-SCNC: 11 MMOL/L (ref 7–16)
BUN SERPL-MCNC: 16 MG/DL (ref 8–22)
CALCIUM SERPL-MCNC: 9.8 MG/DL (ref 8.5–10.5)
CHLORIDE SERPL-SCNC: 106 MMOL/L (ref 96–112)
CHOLEST SERPL-MCNC: 196 MG/DL (ref 100–199)
CO2 SERPL-SCNC: 26 MMOL/L (ref 20–33)
CREAT SERPL-MCNC: 0.82 MG/DL (ref 0.5–1.4)
FASTING STATUS PATIENT QL REPORTED: NORMAL
GFR SERPLBLD CREATININE-BSD FMLA CKD-EPI: 79 ML/MIN/1.73 M 2
GLUCOSE SERPL-MCNC: 82 MG/DL (ref 65–99)
HDLC SERPL-MCNC: 62 MG/DL
LDLC SERPL CALC-MCNC: 114 MG/DL
POTASSIUM SERPL-SCNC: 4.7 MMOL/L (ref 3.6–5.5)
RHEUMATOID FACT SER IA-ACNC: <10 IU/ML (ref 0–14)
SODIUM SERPL-SCNC: 143 MMOL/L (ref 135–145)
TRIGL SERPL-MCNC: 99 MG/DL (ref 0–149)
TSH SERPL DL<=0.005 MIU/L-ACNC: 0.62 UIU/ML (ref 0.38–5.33)

## 2023-02-28 ENCOUNTER — HOSPITAL ENCOUNTER (OUTPATIENT)
Dept: RADIOLOGY | Facility: MEDICAL CENTER | Age: 63
End: 2023-02-28
Attending: INTERNAL MEDICINE
Payer: COMMERCIAL

## 2023-02-28 DIAGNOSIS — E06.3 HYPOTHYROIDISM, ACQUIRED, AUTOIMMUNE: Chronic | ICD-10-CM

## 2023-02-28 DIAGNOSIS — E04.1 LEFT THYROID NODULE: Chronic | ICD-10-CM

## 2023-02-28 DIAGNOSIS — E04.1 LEFT THYROID NODULE: ICD-10-CM

## 2023-02-28 PROCEDURE — 76536 US EXAM OF HEAD AND NECK: CPT

## 2023-03-10 ENCOUNTER — HOSPITAL ENCOUNTER (OUTPATIENT)
Facility: MEDICAL CENTER | Age: 63
End: 2023-03-10
Attending: PHYSICIAN ASSISTANT
Payer: MEDICARE

## 2023-03-10 ENCOUNTER — GYNECOLOGY VISIT (OUTPATIENT)
Dept: OBGYN | Facility: CLINIC | Age: 63
End: 2023-03-10
Payer: MEDICARE

## 2023-03-10 VITALS
WEIGHT: 150 LBS | DIASTOLIC BLOOD PRESSURE: 73 MMHG | BODY MASS INDEX: 22.73 KG/M2 | SYSTOLIC BLOOD PRESSURE: 122 MMHG | HEIGHT: 68 IN

## 2023-03-10 DIAGNOSIS — Z12.39 ENCOUNTER FOR BREAST CANCER SCREENING USING NON-MAMMOGRAM MODALITY: ICD-10-CM

## 2023-03-10 DIAGNOSIS — M85.9 DISORDER OF BONE DENSITY AND STRUCTURE, UNSPECIFIED: ICD-10-CM

## 2023-03-10 DIAGNOSIS — Z12.4 SCREENING FOR CERVICAL CANCER: ICD-10-CM

## 2023-03-10 DIAGNOSIS — N81.10 VAGINAL PROLAPSE: ICD-10-CM

## 2023-03-10 PROCEDURE — 99204 OFFICE O/P NEW MOD 45 MIN: CPT | Mod: 25 | Performed by: PHYSICIAN ASSISTANT

## 2023-03-10 PROCEDURE — G0101 CA SCREEN;PELVIC/BREAST EXAM: HCPCS | Performed by: PHYSICIAN ASSISTANT

## 2023-03-10 PROCEDURE — 88175 CYTOPATH C/V AUTO FLUID REDO: CPT

## 2023-03-10 PROCEDURE — 87624 HPV HI-RISK TYP POOLED RSLT: CPT

## 2023-03-10 ASSESSMENT — FIBROSIS 4 INDEX: FIB4 SCORE: 1.36

## 2023-03-10 NOTE — PROGRESS NOTES
ANNUAL GYNECOLOGY VISIT    HPI:  Patient is a 62 y.o.  who presents for her annual exam.   Pt c/o vaginal prolapse x 2 years. Reports sensations of 'something falling out' worse with increased intraabdominal pressure. Pt is very active but has stopped some of her exercise routine especially abdominal exercises due to sx. Also reports sensation of bladder not fully emptying.  Denies urinary leakage, bulge sensation, or frequent UTIs. No hx of abdominal surgery. Pt concerned this is related to hx of 'low kidney' per imaging done in [place].       PREVENTIVE CARE:  Immunization History   Administered Date(s) Administered    MODERNA SARS-COV-2 VACCINE (12+) 2021, 2021    Tdap Vaccine 2022     Last Mammogram: >1y ago in [place], only willing to get breast US   Last DEXA: never, will turn 63y this week   Taking Calcium/Vit D Supp: no  Personal Hx of fracture as an adult: No  Hx of fracture in first-degree relative: No   race: Yes  Dementia: No  Poor health/frailty: No  Recurrent falls: No  Tobacco use: No  Low body weight (<127 lbs): No  Early menopause (age <45) or BSO: No  Alcoholism:No  Inadequate physical activity: No      CANCER RISK ASSESSMENT:  Family history of:   - Breast cancer: no   - Ovarian cancer: no   - Uterine cancer: no   - Colon cancer: no    GYN HX and ROS:   Last Pap: 2-3 years ago, unknown if cotesting  Hx Mod or Severe Dysplasia : No  Age at Menopause: 50y    Postmenopausal bleeding: No  Sexual problems: No  Significant pelvic pain: No  Bothersome menopausal symptoms: No    ROS:    Positive ROS: prolapse   General: She denies excessive fatigue, weakness, unexplained weight loss or gain, abnormal thirst, unexplained fever/chills.  Neurologic: She denies fainting spells, convulsions, dizzy spells, frequent severe headaches, depression or anxiety or vision changes.  HEENT: She denies unusual skin moles, persistent swollen glands, pain or stiff neck, goiter or lump in her  neck.  Heart / Lung: She denies persistent cough, shortness of breath, severe chest pain or recurrent heart flutters.  Breast: She denies breast discharge, pain, lump or lump under her arm.  Gastrointestinal: She denies bloody stools, loss of appetite, change in bowel habits, constipation, diarrhea, nausea, vomiting or persistent abdominal pain.  Urinary: She denies dysuria, urgency, frequency, incontinence, hematuria, kidney or bladder infections.  Extremeties: She denies joint pain, persistently swollen ankles or recurrent leg cramps.        OBSTETRIC HISTORY:  OB History    Para Term  AB Living   2 2           SAB IAB Ectopic Molar Multiple Live Births                    # Outcome Date GA Lbr Jose Juan/2nd Weight Sex Delivery Anes PTL Lv   2 Term 79 40w0d   F Vag-Spont None N CRESENCIO   1 Para                MEDICAL HISTORY:  Past Medical History:   Diagnosis Date    Chronic autoimmune thyroiditis      + US / + TPO =293    Hyperlipidemia 10/21/2014    Hypothyroidism     Multiple thyroid nodules        MEDICATIONS:  Current Outpatient Medications   Medication Sig Dispense Refill    celecoxib (CELEBREX) 200 MG Cap TAKE 1 CAPSULE BY MOUTH EVERY DAY (Patient not taking: Reported on 2/15/2023) 90 Capsule 0    atorvastatin (LIPITOR) 20 MG Tab Take 1 Tablet by mouth every day. 90 Tablet 3    levothyroxine (SYNTHROID) 75 MCG Tab Take 1 Tablet by mouth every morning on an empty stomach. 90 Tablet 3    Vitamin D, Cholecalciferol, 50 MCG (2000 UT) Cap Take  by mouth.      Ascorbic Acid (VITAMIN C) 1000 MG Tab Take  by mouth.      Nutritional Supplements (ESTROVEN PO) Take  by mouth.      ciprofloxacin (CIPRO) 500 MG Tab Take 500 mg by mouth 2 times a day. (Patient not taking: Reported on 2/15/2023)       No current facility-administered medications for this visit.        ALLERGIES / REACTIONS:  No Known Allergies    FAMILY HISTORY:  Family History   Problem Relation Age of Onset    Heart Disease Mother     Other  Father         cirrhosis    Diabetes Neg Hx     Stroke Neg Hx        SURGICAL HISTORY:  Past Surgical History:   Procedure Laterality Date    OTHER ORTHOPEDIC SURGERY      L2-3 discectomy       SOCIAL HISTORY:  Social History     Tobacco Use    Smoking status: Never    Smokeless tobacco: Never   Vaping Use    Vaping Use: Never used   Substance Use Topics    Alcohol use: No     Alcohol/week: 0.0 oz    Drug use: No        PHYSICAL EXAMINATION:  Vital Signs: There were no vitals taken for this visit.   Constitutional: The patient is well developed and well nourished.  Psychiatric: Patient is oriented to time place and person.   Skin: No rash observed.  Neck: Appears symmetric. There are no masses or adenopathy present.  Cardiovascular: Regular rate and rhythm without murmur.  Lungs: Clear to ausculation.  Breast: Inspection reveals no asymetry or nipple discharge, no skin thickening, dimpling or erythema.  Palpation demonstrates no masses.  Abdomen: Soft, non-tender.  Pelvic Exam:     Vulva: External female genitalia within normal limits. No lesions    Urethra - no lesions, no erythema    Vagina: laxity and poor tone of anterior and apical vaginal wall slightly bulging with valsalva    Cervix: pink, smooth, no lesions, no CMT    Uterus - non-tender, normal size, shape, contour, mobile, parous    Ovaries: non-tender, no appreciable masses  Pap Smear Performed: Yes  Extremeties: Legs are symmetric and without tenderness. There is no edema present.    LABS:  No results found for: TSH  HDL (mg/dL)   Date Value   02/15/2023 62     LDL (mg/dL)   Date Value   02/15/2023 114 (H)       ASSESSMENT AND PLAN:  62 y.o. .here for annual exam    1. Vaginal prolapse    - Discussed possible treatment options of conservative (pelvic floor PT, pessary) vs surgical interventions. Pt will start with pelvic floor PT and follow up with Urogynecology. Referrals placed today.     2. Screening for cervical cancer    - THINPREP PAP WITH HPV;  Future    3. Encounter for breast cancer screening using non-mammogram modality    - US-SCREENING WHOLE BREAST BILATERAL (3D SCREENING); Future    4. Disorder of bone density and structure, unspecified    - DS-BONE DENSITY STUDY (DEXA); Future        Follow up: with urogyn    Elaine Small P.A.-C.

## 2023-03-13 LAB
CYTOLOGY REG CYTOL: NORMAL
HPV HR 12 DNA CVX QL NAA+PROBE: NEGATIVE
HPV16 DNA SPEC QL NAA+PROBE: NEGATIVE
HPV18 DNA SPEC QL NAA+PROBE: NEGATIVE
SPECIMEN SOURCE: NORMAL

## 2023-04-11 ENCOUNTER — OFFICE VISIT (OUTPATIENT)
Dept: ENDOCRINOLOGY | Facility: MEDICAL CENTER | Age: 63
End: 2023-04-11
Attending: INTERNAL MEDICINE
Payer: MEDICARE

## 2023-04-11 VITALS
OXYGEN SATURATION: 97 % | SYSTOLIC BLOOD PRESSURE: 108 MMHG | DIASTOLIC BLOOD PRESSURE: 68 MMHG | BODY MASS INDEX: 22.88 KG/M2 | WEIGHT: 151 LBS | HEIGHT: 68 IN | HEART RATE: 80 BPM

## 2023-04-11 DIAGNOSIS — E78.5 DYSLIPIDEMIA: ICD-10-CM

## 2023-04-11 DIAGNOSIS — M85.89 OSTEOPENIA OF MULTIPLE SITES: ICD-10-CM

## 2023-04-11 DIAGNOSIS — E04.1 LEFT THYROID NODULE: ICD-10-CM

## 2023-04-11 DIAGNOSIS — E55.9 VITAMIN D DEFICIENCY: ICD-10-CM

## 2023-04-11 DIAGNOSIS — E06.3 HYPOTHYROIDISM, ACQUIRED, AUTOIMMUNE: ICD-10-CM

## 2023-04-11 PROCEDURE — 99214 OFFICE O/P EST MOD 30 MIN: CPT | Performed by: INTERNAL MEDICINE

## 2023-04-11 PROCEDURE — 99211 OFF/OP EST MAY X REQ PHY/QHP: CPT | Performed by: INTERNAL MEDICINE

## 2023-04-11 RX ORDER — LEVOTHYROXINE SODIUM 0.07 MG/1
75 TABLET ORAL
Qty: 90 TABLET | Refills: 3 | Status: SHIPPED | OUTPATIENT
Start: 2023-04-11

## 2023-04-11 RX ORDER — CIPROFLOXACIN 500 MG/1
500 TABLET, FILM COATED ORAL 2 TIMES DAILY
COMMUNITY

## 2023-04-11 ASSESSMENT — FIBROSIS 4 INDEX: FIB4 SCORE: 1.38

## 2023-04-11 NOTE — PROGRESS NOTES
Chief Complaint: Follow up for Primary Hypothyroidism secondary to Hashimoto's thyroiditis, left lower lobe exophytic thyroid nodule versus parathyroid adenoma, osteopenia    HPI:     Vivi Sommer is a 63 y.o. female here for follow up of the above medical issues. She was previously a patient of Dr. Jones    She has Hashimoto's thyroiditis. TPO antibodies at baseline were over 200.  She denies a family history of thyroid cancer. She has a history of multiple thyroid nodules with dominant hypoechoic solid nodule measuring 1.2 cm on the left lower lobe which was previously biopsied in 2015 with nondiagnostic findings. She has declined repeat biopsy.      She has had a repeat ultrasound in August 2022 and most recently on February 2023 which showed a stable nodule on the left lower lobe.  There has been a question whether this nodule represents a parathyroid or it is just an exophytic nodule but on the most recent images from February 2023 this nodule appears to be intrathyroidal.    It is taller than wide and hypoechoic and I have recommended repeat biopsy to the patient but she is concerned about having to do another biopsy and prefers not to schedule a biopsy at this time      She also has osteopenia based on last bone density from October 7, 2014 showing the lowest T score of -2.1 for the left femoral neck with FRAX scores that are not significantly elevated. Her 10-year risk for any major osteoporotic fracture was 9.3%. Her 10-year risk for any major hip fracture was 1.7%    She is now taking vitamin D  She is also taking calcium supplements  She denies interval falls and fractures  When I last saw her more than 6 months ago I reminded the patient to complete a bone density test but she told me that she was going to [place]  She informing today that she still cannot complete the bone density because of personal reasons        Since last visit patient reports feeling excellent.  She remains on  Levothyroxine 75 MCG daily which has been her dose for the past 24  months.   She reports excellent compliance and denies missing any daily doses.   She takes thyroid hormone prior to breakfast.   She  denies taking any iron, calcium supplements or antacids.      Weight has been stable    She currently denies denies fatigue, weight changes, heat/cold intolerance, bowel/skin changes or CVS symptoms.         Her most recent labs dated February 15, 2023 showed normal calcium of 9.8 with a normal TSH of 0.620 vitamin D of 59.  PTH levels are not available but they were previously normal in the past.      Patient's medications, allergies, and social histories were reviewed and updated as appropriate.      ROS:     CONS:     No fever, no chills   EYES:     No diplopia, no blurry vision   CV:           No chest pain, no palpitations   PULM:     No SOB, no cough, no hemoptysis.   GI:            No nausea, no vomiting, no diarrhea, no constipation   ENDO:     No polyuria, no polydipsia, no heat intolerance, no cold intolerance       Past Medical History:  Problem List:  2023-02: Left hand pain  2023-02: Left wrist pain  2023-02: Colon polyp  2023-02: Female genital prolapse, unspecified  2021-07: Kidney problem  2021-07: Abnormal CBC  2021-02: Arthritis  2021-02: Vision loss  2018-12: Vitamin D deficiency  2018-12: Left foot pain  2018-04: Renal mass  2018-04: Ganglion cyst  2018-04: Encounter to establish care with new doctor  2018-04: Thyroid nodule  2016-03: Hypothyroidism, acquired, autoimmune  2015-08: Hypothyroidism  2015-08: Left thyroid nodule  2015-08: Nasal sinus cyst  2015-08: Bilateral shoulder pain  2015-07: Fatigue  2015-07: Family history of heart disease in female family member   before age 65  2014-10: Dyslipidemia  2014-10: S/P laminectomy  2014-10: Osteopenia      Past Surgical History:  Past Surgical History:   Procedure Laterality Date    OTHER ORTHOPEDIC SURGERY      L2-3 discectomy     "    Allergies:  Patient has no known allergies.     Social History:  Social History     Tobacco Use    Smoking status: Never    Smokeless tobacco: Never   Vaping Use    Vaping Use: Never used   Substance Use Topics    Alcohol use: No     Alcohol/week: 0.0 oz    Drug use: No        Family History:   family history includes Heart Disease in her mother; Other in her father.      PHYSICAL EXAM:   Vital signs: /68 (BP Location: Right arm, Patient Position: Sitting)   Pulse 80   Ht 1.727 m (5' 8\")   Wt 68.5 kg (151 lb)   SpO2 97%   BMI 22.96 kg/m²   GENERAL: Well-developed, well-nourished in no apparent distress.   EYE:  No ocular asymmetry, PERRLA  HENT: Pink, moist mucous membranes.    NECK: No thyromegaly.   CARDIOVASCULAR:  No murmurs  LUNGS: Clear breath sounds  ABDOMEN: Soft, nontender   EXTREMITIES: No clubbing, cyanosis, or edema.   NEUROLOGICAL: No gross focal motor abnormalities   LYMPH: No cervical adenopathy palpated.   SKIN: No rashes, lesions.       Labs:  Lab Results   Component Value Date/Time    SODIUM 143 02/15/2023 09:55 AM    POTASSIUM 4.7 02/15/2023 09:55 AM    CHLORIDE 106 02/15/2023 09:55 AM    CO2 26 02/15/2023 09:55 AM    ANION 11.0 02/15/2023 09:55 AM    GLUCOSE 82 02/15/2023 09:55 AM    BUN 16 02/15/2023 09:55 AM    CREATININE 0.82 02/15/2023 09:55 AM    CALCIUM 9.8 02/15/2023 09:55 AM    ASTSGOT 22 07/14/2021 03:17 PM    ALTSGPT 18 02/15/2023 09:55 AM    TBILIRUBIN 0.5 07/14/2021 03:17 PM    ALBUMIN 4.7 07/14/2021 03:17 PM    TOTPROTEIN 7.7 07/14/2021 03:17 PM    GLOBULIN 3.0 07/14/2021 03:17 PM    AGRATIO 1.6 07/14/2021 03:17 PM       Lab Results   Component Value Date/Time    SODIUM 142 07/14/2021 1517    POTASSIUM 4.8 07/14/2021 1517    CHLORIDE 105 07/14/2021 1517    CO2 28 07/14/2021 1517    GLUCOSE 92 07/14/2021 1517    BUN 19 07/14/2021 1517    CREATININE 0.82 07/14/2021 1517    CALCIUM 9.7 07/14/2021 1517    ANION 9.0 07/14/2021 1517       Lab Results   Component Value " Date/Time    CHOLSTRLTOT 185 02/12/2021 0941    TRIGLYCERIDE 94 02/12/2021 0941    HDL 64 02/12/2021 0941     (H) 02/12/2021 0941       Lab Results   Component Value Date/Time    TSHULTRASEN 1.310 03/02/2022 0833     Lab Results   Component Value Date/Time    FREET4 1.17 03/02/2022 0833     Lab Results   Component Value Date/Time    FREET3 2.65 02/12/2021 0941     No results found for: THYSTIMIG    Lab Results   Component Value Date/Time    MICROSOMALA 293.6 (H) 10/14/2015 0643         Imaging:      ASSESSMENT/PLAN:     1. Hypothyroidism, acquired, autoimmune  Stable   Continue levothyroxine 75 MCG daily  Reviewed proper administration of thyroid hormone  Patient should take thyroid hormone 30-60 minutes before breakfast on an empty stomach plain water and not take it together with food, iron, calcium, and antacids.  Iron, calcium, and antacids should be taken at least 4 hours apart from thyroid hormone.  Repeat TSH levels in 6 months      2. Osteopenia of multiple sites  Stable  No interval falls or fractures  Patient was unable to complete bone density because of family reasons  She now reports insurance issues  Recommend repeating bone density this year as she is overdue    3. Vitamin D deficiency  Stable  Continue monitoring    4. Left thyroid nodule  Stable  Reviewed thyroid US with her  The nodule at this time appears intrathyroidal  Recommended repeat biopsy of nodule but patient declines to schedule it at this time because of personal issues    5. Dyslipidemia  Improved control  Continue monitoring       Return in about 6 months (around 10/11/2023).      Thank you kindly for allowing me to participate in the thyroid care plan for this patient.    Toby Ruby MD, FACE, [facility]    CC:   Belen Trinidad P.A.-C.